# Patient Record
Sex: FEMALE | Race: WHITE | NOT HISPANIC OR LATINO | Employment: OTHER | URBAN - METROPOLITAN AREA
[De-identification: names, ages, dates, MRNs, and addresses within clinical notes are randomized per-mention and may not be internally consistent; named-entity substitution may affect disease eponyms.]

---

## 2017-01-16 ENCOUNTER — ALLSCRIPTS OFFICE VISIT (OUTPATIENT)
Dept: OTHER | Facility: OTHER | Age: 62
End: 2017-01-16

## 2017-01-16 DIAGNOSIS — R10.2 PELVIC AND PERINEAL PAIN: ICD-10-CM

## 2017-01-17 ENCOUNTER — HOSPITAL ENCOUNTER (OUTPATIENT)
Dept: RADIOLOGY | Age: 62
Discharge: HOME | End: 2017-01-17
Payer: COMMERCIAL

## 2017-01-17 DIAGNOSIS — R10.2 PELVIC AND PERINEAL PAIN: ICD-10-CM

## 2017-01-17 PROCEDURE — 76830 TRANSVAGINAL US NON-OB: CPT

## 2017-01-17 PROCEDURE — 76856 US EXAM PELVIC COMPLETE: CPT

## 2017-01-18 ENCOUNTER — LAB CONVERSION - ENCOUNTER (OUTPATIENT)
Dept: OTHER | Facility: OTHER | Age: 62
End: 2017-01-18

## 2017-01-18 ENCOUNTER — GENERIC CONVERSION - ENCOUNTER (OUTPATIENT)
Dept: OTHER | Facility: OTHER | Age: 62
End: 2017-01-18

## 2017-01-18 LAB
BACTERIA UR QL AUTO: NORMAL
BILIRUB UR QL STRIP: NEGATIVE
CHARACTER, URINE (HISTORICAL): CLEAR
COLOR UR: YELLOW
CRYSTAL TYPE (HISTORICAL): NORMAL PERHPF
CRYSTALS URNS QL MICRO: NORMAL
CULTURE RESULT (HISTORICAL): NO GROWTH
FINE GRAN CASTS URNS QL MICRO: NORMAL PERLPF (ref 0–1)
GLUCOSE UR STRIP-MCNC: NEGATIVE MG/DL
HGB UR QL STRIP.AUTO: NEGATIVE
HYALINE CASTS #/AREA URNS LPF: NORMAL PERLPF (ref 0–4)
KETONES UR STRIP-MCNC: NEGATIVE MG/DL
LEUKOCYTE ESTERASE UR QL STRIP: NEGATIVE
NITRITE UR QL STRIP: NEGATIVE
NON-SQ EPI CELLS URNS QL MICRO: NORMAL
PH UR STRIP.AUTO: 6 [PH] (ref 5–8)
PROT UR-MCNC: NEGATIVE G/DL
RBC CASTS URNS QL MICRO: NORMAL PERLPF (ref 0–1)
SP GR UR STRIP.AUTO: 1.03 (ref 1–1.03)
URINE RBC (HISTORICAL): NORMAL PERHPF
UROBILINOGEN UR QL STRIP.AUTO: 0.2 MG/DL (ref 0.2–1)
WBC #/AREA URNS AUTO: NORMAL PERHPF (ref 0–4)

## 2017-01-19 ENCOUNTER — GENERIC CONVERSION - ENCOUNTER (OUTPATIENT)
Dept: OTHER | Facility: OTHER | Age: 62
End: 2017-01-19

## 2017-01-20 ENCOUNTER — ALLSCRIPTS OFFICE VISIT (OUTPATIENT)
Dept: OTHER | Facility: OTHER | Age: 62
End: 2017-01-20

## 2017-01-23 LAB
A. VAGINALIS BY PCR (HISTORICAL): NOT DETECTED
A.VAGINALIS LOG (CELL/ML) (HISTORICAL): <3.25
BVAB 2 (HISTORICAL): NOT DETECTED
C. ALBICANS, DNA OR PCR (HISTORICAL): NOT DETECTED
CANDIDA GENUS (HISTORICAL): NOT DETECTED
GARDNERELLA BY MOL. METHOD (HISTORICAL): <3.25
GARDNERELLA BY MOL. METHOD (HISTORICAL): NOT DETECTED
LACTOBACILLUS (SPECIES) (HISTORICAL): <3.25
LACTOBACILLUS (SPECIES) (HISTORICAL): NOT DETECTED
MEGASPHAERA TYPE 1 (HISTORICAL): NOT DETECTED
TRICHOMONAS (HISTORICAL): NOT DETECTED

## 2017-01-25 ENCOUNTER — LAB CONVERSION - ENCOUNTER (OUTPATIENT)
Dept: OTHER | Facility: OTHER | Age: 62
End: 2017-01-25

## 2017-01-25 LAB — ENDOMETRIAL BIOPSY (HISTORICAL): NORMAL

## 2017-01-27 ENCOUNTER — GENERIC CONVERSION - ENCOUNTER (OUTPATIENT)
Dept: OTHER | Facility: OTHER | Age: 62
End: 2017-01-27

## 2017-01-30 ENCOUNTER — GENERIC CONVERSION - ENCOUNTER (OUTPATIENT)
Dept: OTHER | Facility: OTHER | Age: 62
End: 2017-01-30

## 2017-02-10 ENCOUNTER — GENERIC CONVERSION - ENCOUNTER (OUTPATIENT)
Dept: OTHER | Facility: OTHER | Age: 62
End: 2017-02-10

## 2017-02-13 ENCOUNTER — APPOINTMENT (OUTPATIENT)
Dept: LAB | Facility: MEDICAL CENTER | Age: 62
End: 2017-02-13
Payer: COMMERCIAL

## 2017-02-13 ENCOUNTER — GENERIC CONVERSION - ENCOUNTER (OUTPATIENT)
Dept: OTHER | Facility: OTHER | Age: 62
End: 2017-02-13

## 2017-02-13 ENCOUNTER — TRANSCRIBE ORDERS (OUTPATIENT)
Dept: ADMINISTRATIVE | Facility: HOSPITAL | Age: 62
End: 2017-02-13

## 2017-02-13 DIAGNOSIS — E78.5 HYPERLIPIDEMIA, UNSPECIFIED HYPERLIPIDEMIA TYPE: Primary | ICD-10-CM

## 2017-02-13 LAB
ALBUMIN SERPL BCP-MCNC: 3.7 G/DL (ref 3.5–5)
ALP SERPL-CCNC: 118 U/L (ref 46–116)
ALT SERPL W P-5'-P-CCNC: 50 U/L (ref 12–78)
ANION GAP SERPL CALCULATED.3IONS-SCNC: 4 MMOL/L (ref 4–13)
AST SERPL W P-5'-P-CCNC: 27 U/L (ref 5–45)
BILIRUB SERPL-MCNC: 0.45 MG/DL (ref 0.2–1)
BUN SERPL-MCNC: 18 MG/DL (ref 5–25)
CALCIUM SERPL-MCNC: 8.9 MG/DL (ref 8.3–10.1)
CHLORIDE SERPL-SCNC: 106 MMOL/L (ref 100–108)
CHOLEST SERPL-MCNC: 240 MG/DL (ref 50–200)
CO2 SERPL-SCNC: 27 MMOL/L (ref 21–32)
CREAT SERPL-MCNC: 0.78 MG/DL (ref 0.6–1.3)
GFR SERPL CREATININE-BSD FRML MDRD: >60 ML/MIN/1.73SQ M
GLUCOSE SERPL-MCNC: 79 MG/DL (ref 65–140)
HDLC SERPL-MCNC: 83 MG/DL (ref 40–60)
LDLC SERPL CALC-MCNC: 137 MG/DL (ref 0–100)
LDLC SERPL DIRECT ASSAY-MCNC: 145 MG/DL (ref 0–100)
POTASSIUM SERPL-SCNC: 4.4 MMOL/L (ref 3.5–5.3)
PROT SERPL-MCNC: 7 G/DL (ref 6.4–8.2)
SODIUM SERPL-SCNC: 137 MMOL/L (ref 136–145)
TRIGL SERPL-MCNC: 99 MG/DL

## 2017-02-13 PROCEDURE — 80053 COMPREHEN METABOLIC PANEL: CPT | Performed by: FAMILY MEDICINE

## 2017-02-13 PROCEDURE — 80061 LIPID PANEL: CPT | Performed by: FAMILY MEDICINE

## 2017-02-13 PROCEDURE — 36415 COLL VENOUS BLD VENIPUNCTURE: CPT | Performed by: FAMILY MEDICINE

## 2017-02-13 PROCEDURE — 83721 ASSAY OF BLOOD LIPOPROTEIN: CPT | Performed by: FAMILY MEDICINE

## 2017-02-16 ENCOUNTER — HOSPITAL ENCOUNTER (OUTPATIENT)
Dept: RADIOLOGY | Facility: HOSPITAL | Age: 62
Discharge: HOME | End: 2017-02-16
Payer: COMMERCIAL

## 2017-02-16 ENCOUNTER — TRANSCRIBE ORDERS (OUTPATIENT)
Dept: ADMINISTRATIVE | Facility: HOSPITAL | Age: 62
End: 2017-02-16

## 2017-02-16 ENCOUNTER — ALLSCRIPTS OFFICE VISIT (OUTPATIENT)
Dept: OTHER | Facility: OTHER | Age: 62
End: 2017-02-16

## 2017-02-16 DIAGNOSIS — M25.562 LEFT KNEE PAIN, UNSPECIFIED CHRONICITY: ICD-10-CM

## 2017-02-16 DIAGNOSIS — M25.562 LEFT KNEE PAIN, UNSPECIFIED CHRONICITY: Primary | ICD-10-CM

## 2017-02-16 PROCEDURE — 73562 X-RAY EXAM OF KNEE 3: CPT

## 2017-02-28 ENCOUNTER — TRANSCRIBE ORDERS (OUTPATIENT)
Dept: ADMINISTRATIVE | Facility: HOSPITAL | Age: 62
End: 2017-02-28

## 2017-02-28 ENCOUNTER — ALLSCRIPTS OFFICE VISIT (OUTPATIENT)
Dept: OTHER | Facility: OTHER | Age: 62
End: 2017-02-28

## 2017-02-28 DIAGNOSIS — M25.562 PAIN IN LEFT KNEE: ICD-10-CM

## 2017-02-28 DIAGNOSIS — R93.89 ABNORMAL FINDINGS ON DIAGNOSTIC IMAGING OF OTHER SPECIFIED BODY STRUCTURES: ICD-10-CM

## 2017-02-28 DIAGNOSIS — M25.562 ACUTE PAIN OF LEFT KNEE: Primary | ICD-10-CM

## 2017-02-28 DIAGNOSIS — M76.892 OTHER SPECIFIED ENTHESOPATHIES OF LEFT LOWER LIMB, EXCLUDING FOOT: ICD-10-CM

## 2017-03-07 ENCOUNTER — HOSPITAL ENCOUNTER (OUTPATIENT)
Dept: RADIOLOGY | Facility: HOSPITAL | Age: 62
Discharge: HOME | End: 2017-03-07
Payer: COMMERCIAL

## 2017-03-07 DIAGNOSIS — M25.562 PAIN IN LEFT KNEE: ICD-10-CM

## 2017-03-07 PROCEDURE — 73721 MRI JNT OF LWR EXTRE W/O DYE: CPT

## 2017-03-15 ENCOUNTER — ALLSCRIPTS OFFICE VISIT (OUTPATIENT)
Dept: OTHER | Facility: OTHER | Age: 62
End: 2017-03-15

## 2017-03-27 ENCOUNTER — ALLSCRIPTS OFFICE VISIT (OUTPATIENT)
Dept: OTHER | Facility: OTHER | Age: 62
End: 2017-03-27

## 2017-03-27 PROCEDURE — 88305 TISSUE EXAM BY PATHOLOGIST: CPT | Performed by: OBSTETRICS & GYNECOLOGY

## 2017-03-28 ENCOUNTER — LAB REQUISITION (OUTPATIENT)
Dept: LAB | Facility: HOSPITAL | Age: 62
End: 2017-03-28
Payer: COMMERCIAL

## 2017-03-28 DIAGNOSIS — R93.89 ABNORMAL FINDINGS ON DIAGNOSTIC IMAGING OF OTHER SPECIFIED BODY STRUCTURES: ICD-10-CM

## 2017-04-04 ENCOUNTER — GENERIC CONVERSION - ENCOUNTER (OUTPATIENT)
Dept: OTHER | Facility: OTHER | Age: 62
End: 2017-04-04

## 2017-04-10 ENCOUNTER — GENERIC CONVERSION - ENCOUNTER (OUTPATIENT)
Dept: OTHER | Facility: OTHER | Age: 62
End: 2017-04-10

## 2017-05-23 ENCOUNTER — TRANSCRIBE ORDERS (OUTPATIENT)
Dept: ADMINISTRATIVE | Facility: HOSPITAL | Age: 62
End: 2017-05-23

## 2017-05-23 DIAGNOSIS — M54.5 LOW BACK PAIN, UNSPECIFIED BACK PAIN LATERALITY, UNSPECIFIED CHRONICITY, WITH SCIATICA PRESENCE UNSPECIFIED: Primary | ICD-10-CM

## 2017-05-28 ENCOUNTER — HOSPITAL ENCOUNTER (OUTPATIENT)
Dept: RADIOLOGY | Facility: HOSPITAL | Age: 62
Discharge: HOME | End: 2017-05-28
Payer: COMMERCIAL

## 2017-05-28 DIAGNOSIS — M54.5 LOW BACK PAIN, UNSPECIFIED BACK PAIN LATERALITY, UNSPECIFIED CHRONICITY, WITH SCIATICA PRESENCE UNSPECIFIED: ICD-10-CM

## 2017-05-28 PROCEDURE — 72148 MRI LUMBAR SPINE W/O DYE: CPT

## 2017-06-14 ENCOUNTER — APPOINTMENT (OUTPATIENT)
Dept: PHYSICAL THERAPY | Facility: MEDICAL CENTER | Age: 62
End: 2017-06-14
Payer: COMMERCIAL

## 2017-06-14 PROCEDURE — 97162 PT EVAL MOD COMPLEX 30 MIN: CPT

## 2017-06-19 ENCOUNTER — APPOINTMENT (OUTPATIENT)
Dept: PHYSICAL THERAPY | Facility: MEDICAL CENTER | Age: 62
End: 2017-06-19
Payer: COMMERCIAL

## 2017-06-19 ENCOUNTER — GENERIC CONVERSION - ENCOUNTER (OUTPATIENT)
Dept: OTHER | Facility: OTHER | Age: 62
End: 2017-06-19

## 2017-06-19 PROCEDURE — 97010 HOT OR COLD PACKS THERAPY: CPT

## 2017-06-19 PROCEDURE — 97110 THERAPEUTIC EXERCISES: CPT

## 2017-06-19 PROCEDURE — 97140 MANUAL THERAPY 1/> REGIONS: CPT

## 2017-06-21 ENCOUNTER — APPOINTMENT (OUTPATIENT)
Dept: PHYSICAL THERAPY | Facility: MEDICAL CENTER | Age: 62
End: 2017-06-21
Payer: COMMERCIAL

## 2017-06-21 PROCEDURE — 97140 MANUAL THERAPY 1/> REGIONS: CPT

## 2017-06-21 PROCEDURE — 97110 THERAPEUTIC EXERCISES: CPT

## 2017-06-26 ENCOUNTER — APPOINTMENT (OUTPATIENT)
Dept: PHYSICAL THERAPY | Facility: MEDICAL CENTER | Age: 62
End: 2017-06-26
Payer: COMMERCIAL

## 2017-06-26 PROCEDURE — 97110 THERAPEUTIC EXERCISES: CPT

## 2017-06-26 PROCEDURE — 97140 MANUAL THERAPY 1/> REGIONS: CPT

## 2017-06-26 PROCEDURE — 97010 HOT OR COLD PACKS THERAPY: CPT

## 2017-06-28 ENCOUNTER — APPOINTMENT (OUTPATIENT)
Dept: PHYSICAL THERAPY | Facility: MEDICAL CENTER | Age: 62
End: 2017-06-28
Payer: COMMERCIAL

## 2017-06-28 PROCEDURE — 97140 MANUAL THERAPY 1/> REGIONS: CPT

## 2017-06-28 PROCEDURE — 97010 HOT OR COLD PACKS THERAPY: CPT

## 2017-06-28 PROCEDURE — 97110 THERAPEUTIC EXERCISES: CPT

## 2017-07-03 ENCOUNTER — APPOINTMENT (OUTPATIENT)
Dept: PHYSICAL THERAPY | Facility: MEDICAL CENTER | Age: 62
End: 2017-07-03
Payer: COMMERCIAL

## 2017-07-06 ENCOUNTER — APPOINTMENT (OUTPATIENT)
Dept: PHYSICAL THERAPY | Facility: MEDICAL CENTER | Age: 62
End: 2017-07-06
Payer: COMMERCIAL

## 2017-07-06 PROCEDURE — 97110 THERAPEUTIC EXERCISES: CPT

## 2017-07-06 PROCEDURE — 97140 MANUAL THERAPY 1/> REGIONS: CPT

## 2017-07-10 ENCOUNTER — APPOINTMENT (OUTPATIENT)
Dept: PHYSICAL THERAPY | Facility: MEDICAL CENTER | Age: 62
End: 2017-07-10
Payer: COMMERCIAL

## 2017-07-10 PROCEDURE — 97110 THERAPEUTIC EXERCISES: CPT

## 2017-07-10 PROCEDURE — 97140 MANUAL THERAPY 1/> REGIONS: CPT

## 2017-07-12 ENCOUNTER — APPOINTMENT (OUTPATIENT)
Dept: PHYSICAL THERAPY | Facility: MEDICAL CENTER | Age: 62
End: 2017-07-12
Payer: COMMERCIAL

## 2017-07-12 PROCEDURE — 97140 MANUAL THERAPY 1/> REGIONS: CPT

## 2017-07-12 PROCEDURE — 97110 THERAPEUTIC EXERCISES: CPT

## 2017-07-14 ENCOUNTER — GENERIC CONVERSION - ENCOUNTER (OUTPATIENT)
Dept: OTHER | Facility: OTHER | Age: 62
End: 2017-07-14

## 2017-07-17 ENCOUNTER — APPOINTMENT (OUTPATIENT)
Dept: PHYSICAL THERAPY | Facility: MEDICAL CENTER | Age: 62
End: 2017-07-17
Payer: COMMERCIAL

## 2017-07-19 ENCOUNTER — APPOINTMENT (OUTPATIENT)
Dept: PHYSICAL THERAPY | Facility: MEDICAL CENTER | Age: 62
End: 2017-07-19
Payer: COMMERCIAL

## 2017-07-31 ENCOUNTER — ALLSCRIPTS OFFICE VISIT (OUTPATIENT)
Dept: OTHER | Facility: OTHER | Age: 62
End: 2017-07-31

## 2017-07-31 ENCOUNTER — APPOINTMENT (OUTPATIENT)
Dept: LAB | Facility: CLINIC | Age: 62
End: 2017-07-31
Payer: COMMERCIAL

## 2017-07-31 ENCOUNTER — TRANSCRIBE ORDERS (OUTPATIENT)
Dept: LAB | Facility: CLINIC | Age: 62
End: 2017-07-31

## 2017-07-31 DIAGNOSIS — M51.16 INTERVERTEBRAL DISC DISORDER WITH RADICULOPATHY OF LUMBAR REGION: ICD-10-CM

## 2017-07-31 DIAGNOSIS — Z01.818 ENCOUNTER FOR OTHER PREPROCEDURAL EXAMINATION: ICD-10-CM

## 2017-07-31 DIAGNOSIS — D49.2 NEOPLASM OF UNSPECIFIED BEHAVIOR OF BONE, SOFT TISSUE, AND SKIN: ICD-10-CM

## 2017-07-31 LAB
BUN SERPL-MCNC: 19 MG/DL (ref 5–25)
CREAT SERPL-MCNC: 0.73 MG/DL (ref 0.6–1.3)
GFR SERPL CREATININE-BSD FRML MDRD: 90 ML/MIN/1.73SQ M

## 2017-07-31 PROCEDURE — 84520 ASSAY OF UREA NITROGEN: CPT

## 2017-07-31 PROCEDURE — 36415 COLL VENOUS BLD VENIPUNCTURE: CPT

## 2017-07-31 PROCEDURE — 82565 ASSAY OF CREATININE: CPT

## 2017-08-09 ENCOUNTER — ALLSCRIPTS OFFICE VISIT (OUTPATIENT)
Dept: OTHER | Facility: OTHER | Age: 62
End: 2017-08-09

## 2017-08-09 ENCOUNTER — TRANSCRIBE ORDERS (OUTPATIENT)
Dept: ADMINISTRATIVE | Facility: HOSPITAL | Age: 62
End: 2017-08-09

## 2017-08-09 DIAGNOSIS — M51.16 NEURITIS OR RADICULITIS DUE TO RUPTURE OF LUMBAR INTERVERTEBRAL DISC: Primary | ICD-10-CM

## 2017-08-11 ENCOUNTER — GENERIC CONVERSION - ENCOUNTER (OUTPATIENT)
Dept: OTHER | Facility: OTHER | Age: 62
End: 2017-08-11

## 2017-08-11 ENCOUNTER — ALLSCRIPTS OFFICE VISIT (OUTPATIENT)
Dept: OTHER | Facility: OTHER | Age: 62
End: 2017-08-11

## 2017-08-25 ENCOUNTER — HOSPITAL ENCOUNTER (OUTPATIENT)
Dept: RADIOLOGY | Facility: HOSPITAL | Age: 62
Discharge: HOME | End: 2017-08-25
Attending: ORTHOPAEDIC SURGERY
Payer: COMMERCIAL

## 2017-08-25 DIAGNOSIS — M51.16 INTERVERTEBRAL DISC DISORDER WITH RADICULOPATHY OF LUMBAR REGION: ICD-10-CM

## 2017-08-25 DIAGNOSIS — D49.2 NEOPLASM OF UNSPECIFIED BEHAVIOR OF BONE, SOFT TISSUE, AND SKIN: ICD-10-CM

## 2017-08-25 PROCEDURE — 72158 MRI LUMBAR SPINE W/O & W/DYE: CPT

## 2017-08-25 PROCEDURE — A9585 GADOBUTROL INJECTION: HCPCS | Performed by: ORTHOPAEDIC SURGERY

## 2017-08-25 RX ADMIN — GADOBUTROL 8 ML: 604.72 INJECTION INTRAVENOUS at 16:30

## 2017-09-18 ENCOUNTER — ALLSCRIPTS OFFICE VISIT (OUTPATIENT)
Dept: OTHER | Facility: OTHER | Age: 62
End: 2017-09-18

## 2017-11-18 ENCOUNTER — APPOINTMENT (OUTPATIENT)
Dept: URGENT CARE | Facility: MEDICAL CENTER | Age: 62
End: 2017-11-18
Payer: COMMERCIAL

## 2017-11-18 PROCEDURE — 99203 OFFICE O/P NEW LOW 30 MIN: CPT

## 2017-12-10 ENCOUNTER — OFFICE VISIT (OUTPATIENT)
Dept: URGENT CARE | Facility: MEDICAL CENTER | Age: 62
End: 2017-12-10
Payer: COMMERCIAL

## 2017-12-10 PROCEDURE — 99213 OFFICE O/P EST LOW 20 MIN: CPT

## 2017-12-12 NOTE — PROGRESS NOTES
Assessment    1  Chronic sinusitis (473 9) (J32 9)    Plan  Chronic sinusitis    · Clindamycin HCl - 300 MG Oral Capsule; Take 1 capsule twice daily    Discussion/Summary  Discussion Summary:   1  Take Clindamycin 300mg  1 tablet twice daily x 7 daysPush fluidsUse Flonase 2 sprays each nostril dailyAcidophilus daily while on Clindamycin5  Recommend follow-up with ENT  Medication Side Effects Reviewed: Possible side effects of new medications were reviewed with the patient/guardian today  Understands and agrees with treatment plan: The treatment plan was reviewed with the patient/guardian  The patient/guardian understands and agrees with the treatment plan      Chief Complaint    1  Headache  Chief Complaint Free Text Note Form: Pt presents with recurrent sinusitis      History of Present Illness  HPI: Patient is a 78-year-old female presents with a 6 week history of recurring sinus pressure, headache and postnasal drip  She states she was treated with Bactrim 2 weeks prior which improved her symptoms somewhat but they returned over the past 7 days  Patient denies new fever but does complain of increasing headaches and sinus pressure  She is scheduled to see ear nose and throat on 12/29/2017  Hospital Based Practices Required Assessment:  Pain Assessment  the patient states they have pain  The pain is located in the sinus  (on a scale of 0 to 10, the patient rates the pain at 6 )  Abuse And Domestic Violence Screen   Yes, the patient is safe at home  -- The patient states no one is hurting them  Depression And Suicide Screen  No, the patient has not had thoughts of hurting themself  No, the patient has not felt depressed in the past 7 days  Prefered Language is  english  Primary Language is  english  Readiness To Learn: Receptive  Barriers To Learning: none    Preferred Learning: verbal      Review of Systems  Focused-Female:  Constitutional: No fever, no chills, feels well, no tiredness, no recent weight gain or loss  ENT: no sore throat-- and-- no nasal discharge  Respiratory: cough-- and-- PND  Active Problems  1  Abdominal pain, LLQ (left lower quadrant) (789 04) (R10 32)   2  Backache (724 5) (M54 9)   3  Complex endometrial hyperplasia without atypia (621 32) (N85 01)   4  History of sinusitis (V12 69) (Z87 09)   5  Intervertebral disc disorder with radiculopathy of lumbar region (724 4) (M51 16)   6  Left knee pain (719 46) (M25 562)   7  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)   8  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)   9  Pelvic pain in female (625 9) (R10 2)   10  Pes anserinus tendinitis of left lower extremity (726 61) (M76 892)   11  Postmenopausal bleeding (627 1) (N95 0)   12  Pre-op testing (V72 84) (Z01 818)   13  Symptoms involving urinary system (788 99) (R39 9)   14  Thickened endometrium (793 5) (R93 8)   15  Urinary frequency (788 41) (R35 0)   16  Urinary hesitancy (788 64) (R39 11)   17  Vaginal discharge (623 5) (N89 8)    Past Medical History  1  History of Cervical polyp (622 7) (N84 1)   2  History of candidal vulvovaginitis (V13 29) (Z86 19)   3  History of herpes simplex infection (V12 09) (Z86 19)   4  History of Mild cervical dysplasia (622 11) (N87 0)   5  Normal delivery (650) (O80,Z37 9)   6  History of Right rotator cuff tear (840 4) (M75 101)   7  History of Supraventricular tachycardia (427 89) (I47 1)   8  History of Urinary Tract Infection (V13 02)  Active Problems And Past Medical History Reviewed: The active problems and past medical history were reviewed and updated today  Family History  Mother    1  Family history of Anemia (V18 2)   2  Family history of Hypertension (V17 49)  Father    3  Family history of Hypertension (V17 49)   4  Family history of Prostate cancer (80) (C61)  Sister    5  Family history of Anemia (V18 2)   6  Family history of Thyroid Cancer  Maternal Grandmother    7   Family history of Coronary Arteriosclerosis (V17 49)  Maternal Grandfather    8  Family history of Lung Cancer (V16 1)  Family History Reviewed: The family history was reviewed and updated today  Social History   · Always uses seat belt   · Being A Social Drinker   · Caffeine use (V49 89) (F15 90)   · Daily Coffee Consumption (2  Cups/Day)   · Denied: History of Drug use   · Exercising Erratically   · Feels safe at home   · Former smoker (Q90 09) (P99 073)   · Marital History - Currently   Social History Reviewed: The social history was reviewed and updated today  Surgical History    1  History of Appendectomy   2  History of Biopsy Endometrial, Without Cervical Dilation   3  History of Cervical Loop Electrosurgical Excision (LEEP)   4  History of  Section   5  History of Complete Colonoscopy   6  History of Diagnostic Cystoscopy   7  History of Dilation And Curettage   8  History of Hysteroscopy   9  History of Oophorectomy   10  History of Ovarian Cystectomy   11  History of Rotator Cuff Repair   12  History of Salpingo-oophorectomy Right Side   13  History of Tonsillectomy  Surgical History Reviewed: The surgical history was reviewed and updated today  Current Meds   1  Acidophilus CAPS; Therapy: (Recorded:2013) to Recorded   2  B Complex Oral Capsule Recorded   3  Cranberry CAPS Recorded   4  DiphenhydrAMINE HCl - 25 MG Oral Capsule; Therapy: (Recorded:2013) to Recorded   5  MedroxyPROGESTERone Acetate 10 MG Oral Tablet; take 1 tablet by mouth every day; Therapy: 06BLS7916 to ([id number])  Requested for: 2017; Last Rx:2017 Ordered   6  Mucinex 600 MG Oral Tablet Extended Release 12 Hour Recorded   7  Naproxen Sodium 550 MG Oral Tablet; Therapy: (Recorded:03Qac1175) to Recorded   8  Niacin  MG Oral Capsule Extended Release; Therapy: (Recorded:2013) to Recorded   9  Progesterone Micronized 200 MG Oral Capsule; TAKE ONE CAPSULE BY MOUTH EVERY DAY;  Therapy: 65Ztg3534 to (RLLRJIEE:66YPA5532)  Requested for: 24HOE0015; Last Rx:01Nov2017 Ordered   10  Turmeric TABS Recorded   11  Vitamin C 500 MG Oral Tablet Recorded   12  Vitamin D3 1000 UNIT Oral Tablet Recorded   13  Vitamin E Complete Oral Capsule Recorded  Medication List Reviewed: The medication list was reviewed and updated today  Allergies    1  Penicillins    Vitals  Signs   Recorded: 40XZH8455 10:59AM   Temperature: 98 8 F  Heart Rate: 88  Respiration: 18  Systolic: 076  Diastolic: 94  O2 Saturation: 98    Physical Exam   Constitutional  General appearance: No acute distress, well appearing and well nourished  Ears, Nose, Mouth, and Throat  External inspection of ears and nose: Normal    Otoscopic examination: Tympanic membranes translucent with normal light reflex  Canals patent without erythema  Nasal mucosa, septum, and turbinates: Abnormal  -- nasal turbinates boggy and swollen no visible discharge  + maxillary tenderness  Oropharynx: Normal with no erythema, edema, exudate or lesions  Pulmonary  Respiratory effort: No increased work of breathing or signs of respiratory distress  Auscultation of lungs: Clear to auscultation  Cardiovascular  Auscultation of heart: Normal rate and rhythm, normal S1 and S2, without murmurs         Signatures   Electronically signed by : Renée Dowling, [facility]; Dec 10 2017 11:37AM EST                       (Author)    Electronically signed by : PATRIA Posadas Sa ; Dec 11 2017  2:45PM EST                       (Co-author)

## 2018-01-09 NOTE — MISCELLANEOUS
Message   Recorded as Task   Date: 02/10/2017 03:24 PM, Created By: Adithya Amor   Task Name: Medical Complaint Callback   Assigned To: Clementina Read   Regarding Patient: Kimo Dickson, Status: In Progress   Comment:    Mer Amador - 10 Feb 2017 3:24 PM     TASK CREATED  Caller: Self; [phone] (Home)  pt had eb on 1/20 pt states she was told if pt started to spot please call office pt started to spot on Wednesday please advise pt @ [phone]   [facility] - 10 Feb 2017 3:33 PM     TASK IN PROGRESS   Aleyda Uribe - 10 Feb 2017 3:38 PM     TASK EDITED  pt states she has been experiencing pink disch for the past 3 days and was to inform Bri Velazquez - 10 Feb 2017 3:38 PM     TASK REASSIGNED: Previously Assigned To Denna Holter - 13 Feb 2017 2:22 PM     TASK REPLIED TO: Previously Assigned To [address] [place] [address] - please advise that she schedules an appt with one of the docs at her earliest convenience to discuss possible need for D&C/hysteroscopy  Given that she had a normal recent EMB I am reassured, however her endometrial lining measured 9mm on US and now has spotting, so further work up should be considered  If she sees a doc that could be a preop discussion if indicated, so it would be more efficient and save her a trip/copay  Thanks   Dru Linton - 13 Feb 2017 3:08 PM     TASK EDITED  [facility]   Dru Linton - 13 Feb 2017 3:09 PM     TASK EDITED  [facility] - 13 Feb 2017 3:31 PM     TASK EDITED  Pt given apt with VG        Active Problems    1  Abdominal pain, LLQ (left lower quadrant) (789 04) (R10 32)   2  Backache (724 5) (M54 9)   3  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)   4  Pelvic pain in female (625 9) (R10 2)   5  Right rotator cuff tear (840 4) (M75 101)   6  Symptoms involving urinary system (788 99) (R39 9)   7  Thickened endometrium (793 5) (R93 8)   8  Urinary frequency (788 41) (R35 0)   9   Urinary hesitancy (788 64) (R39 11)   10  Vaginal discharge (623 5) (N89 8)    Current Meds   1  Acidophilus CAPS; Therapy: (Recorded:31Oct2013) to Recorded   2  DiphenhydrAMINE HCl - 25 MG Oral Capsule; Therapy: (Recorded:31Oct2013) to Recorded   3  Niacin  MG Oral Capsule Extended Release; Therapy: (Recorded:31Oct2013) to Recorded    Allergies    1  Penicillins    Signatures   Electronically signed by :  Betsy Gipson, ; Feb 13 2017  3:31PM EST                       (Author)

## 2018-01-10 NOTE — MISCELLANEOUS
Message   Recorded as Task   Date: 04/10/2017 09:15 AM, Created By: Paul Weaver   Task Name: Follow Up   Assigned To: Lou Ndiaye   Regarding Patient: Shara Mckeon, Status: Active   CommentAmbreen Elizondo - 10 Apr 2017 9:15 AM     TASK CREATED  Caller: Self; [phone] (Home); [phone] (Work)  [phone] pt wants a natural progesterone not the one dr jewell ordered for her please advise   Nick Munoz - 10 Apr 2017 9:30 AM     TASK REASSIGNED: Previously Assigned To [facility],Team  can you help me out with this?  she would like a natural progesterone, "the one closet to what her body makes"   sp spoke with patient, explained to her that synthetic progestagens are not bad for her health  OK to change to micronized progesterone   will Rx Prometrium 200 mg daily po      Signatures   Electronically signed by : NONA Hernández ; Apr 10 2017 11:16AM EST                       (Author)

## 2018-01-10 NOTE — MISCELLANEOUS
Message   Recorded as Task   Date: 02/10/2017 03:24 PM, Created By: Kendy Martino   Task Name: Medical Complaint Callback   Assigned To: Cesar Marin   Regarding Patient: Ron Rivera, Status: In Progress   Comment:    Mer Amador - 10 Feb 2017 3:24 PM     TASK CREATED  Caller: Self; [phone] (Home)  pt had eb on 1/20 pt states she was told if pt started to spot please call office pt started to spot on Wednesday please advise pt @ [phone]   Thu Thompson - 10 Feb 2017 3:33 PM     TASK IN PROGRESS   Jojo,Aleyda - 10 Feb 2017 3:38 PM     TASK EDITED  pt states she has been experiencing pink disch for the past 3 days and was to inform elif        Active Problems    1  Abdominal pain, LLQ (left lower quadrant) (789 04) (R10 32)   2  Backache (724 5) (M54 9)   3  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)   4  Pelvic pain in female (625 9) (R10 2)   5  Right rotator cuff tear (840 4) (M75 101)   6  Symptoms involving urinary system (788 99) (R39 9)   7  Thickened endometrium (793 5) (R93 8)   8  Urinary frequency (788 41) (R35 0)   9  Urinary hesitancy (788 64) (R39 11)   10  Vaginal discharge (623 5) (N89 8)    Current Meds   1  Acidophilus CAPS; Therapy: (Recorded:31Oct2013) to Recorded   2  DiphenhydrAMINE HCl - 25 MG Oral Capsule; Therapy: (Recorded:31Oct2013) to Recorded   3  Niacin  MG Oral Capsule Extended Release; Therapy: (Recorded:31Oct2013) to Recorded    Allergies    1   Penicillins    Signatures   Electronically signed by : Ana Albert, ; Feb 10 2017  3:38PM EST                       (Author)

## 2018-01-11 NOTE — RESULT NOTES
Message   Recorded as Task   Date: 08/11/2017 10:41 AM, Created By: Neal Winkler   Task Name: Care Coordination   Assigned To: Kathleen Jang   Regarding Patient: Karla Maldonado, Status: Active   Comment:    Kathleen Jang - 11 Aug 2017 10:41 AM     TASK CREATED  Call rec'd from patient  She had been seen by Dr Deysi Walters on 7/31 for back and leg pain  In summary, he also discussed her left knee pain; samples and Rx for Pennsaid provided which she states has been providing some relief  He noted that "we can give left knee intra-articular steroid injection to help reduce inflammation in the joint"  Patient inquired about getting this knee injection, possibly prior to leaving for her vacation on 8/15  No open procedure appts available; we did schedule for 8/31 but I did state that I would inquire with Dr Deysi Walters about possible injection today, pending insurance authorization  Juan Manuel Escoto inquired with Dr Deysi Walters while I contacted [facility]  Spoke with Mabel Samuel ; no Tico Xavier is needed for CPT 05317, 57344 (knee inj, fluoro); ref# [id number]  Per [facility], Dr Deysi Walters agreeable to double-booking patient today for injection (no fluoro/USGI) only @6044  I called patient and she will arrive to [place] office @1062; is aware that this will be billed as OV and co-pay expected  She stated understanding  Very appreciative       (FYI: MRI Lspine with contrast is scheduled for 8/25)   Phyllis Azar - 11 Aug 2017 12:28 PM     TASK REPLIED TO: Previously Assigned To Phyllis Azar md aware

## 2018-01-12 VITALS
DIASTOLIC BLOOD PRESSURE: 80 MMHG | HEIGHT: 64 IN | HEART RATE: 85 BPM | WEIGHT: 200 LBS | SYSTOLIC BLOOD PRESSURE: 145 MMHG | BODY MASS INDEX: 34.15 KG/M2

## 2018-01-12 VITALS
HEART RATE: 88 BPM | HEIGHT: 64 IN | SYSTOLIC BLOOD PRESSURE: 126 MMHG | OXYGEN SATURATION: 98 % | WEIGHT: 196 LBS | DIASTOLIC BLOOD PRESSURE: 76 MMHG | RESPIRATION RATE: 16 BRPM | TEMPERATURE: 98.5 F | BODY MASS INDEX: 33.46 KG/M2

## 2018-01-12 NOTE — MISCELLANEOUS
Message   Recorded as Task   Date: 01/27/2017 02:29 PM, Created By: Charlotte Orta   Task Name: Result Follow Up   Assigned To: [facility],Team   Regarding Patient: Reynaldo Reyes, Status: In Progress   Aileen Winston - 27 Jan 2017 2:29 PM     TASK CREATED  Neg EMB   Please notify patient and advise to monitor closely for  bleeding   Thanks   Zaire Isaac - 27 Jan 2017 4:31 PM     TASK REASSIGNED: Previously Assigned To Levon Carranza - 30 Jan 2017 8:43 AM     TASK IN PROGRESS   Cherylene Hakim - 30 Jan 2017 8:45 AM     TASK EDITED  lmtcb   Cherylene Hakim - 30 Jan 2017 12:12 PM     TASK EDITED  Pt will call if any  bleeding        Active Problems    1  Abdominal pain, LLQ (left lower quadrant) (789 04) (R10 32)   2  Backache (724 5) (M54 9)   3  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)   4  Pelvic pain in female (625 9) (R10 2)   5  Right rotator cuff tear (840 4) (M75 101)   6  Symptoms involving urinary system (788 99) (R39 9)   7  Thickened endometrium (793 5) (R93 8)   8  Urinary frequency (788 41) (R35 0)   9  Urinary hesitancy (788 64) (R39 11)   10  Vaginal discharge (623 5) (N89 8)    Current Meds   1  Acidophilus CAPS; Therapy: (Recorded:31Oct2013) to Recorded   2  DiphenhydrAMINE HCl - 25 MG Oral Capsule; Therapy: (Recorded:31Oct2013) to Recorded   3  Niacin  MG Oral Capsule Extended Release; Therapy: (Recorded:31Oct2013) to Recorded    Allergies    1  Penicillins    Signatures   Electronically signed by :  Kevin Gipson, ; Jan 30 2017 12:13PM EST                       (Author)

## 2018-01-13 VITALS
BODY MASS INDEX: 33.8 KG/M2 | HEIGHT: 64 IN | SYSTOLIC BLOOD PRESSURE: 127 MMHG | HEART RATE: 93 BPM | WEIGHT: 198 LBS | DIASTOLIC BLOOD PRESSURE: 80 MMHG

## 2018-01-13 VITALS
BODY MASS INDEX: 33.29 KG/M2 | WEIGHT: 195 LBS | HEIGHT: 64 IN | SYSTOLIC BLOOD PRESSURE: 146 MMHG | DIASTOLIC BLOOD PRESSURE: 90 MMHG

## 2018-01-13 VITALS
SYSTOLIC BLOOD PRESSURE: 146 MMHG | BODY MASS INDEX: 33.8 KG/M2 | HEART RATE: 80 BPM | DIASTOLIC BLOOD PRESSURE: 78 MMHG | WEIGHT: 198 LBS | RESPIRATION RATE: 16 BRPM | HEIGHT: 64 IN

## 2018-01-13 NOTE — MISCELLANEOUS
Message   Recorded as Task   Date: 01/27/2017 03:07 PM, Created By: Sowmya Atkins   Task Name: Call Back   Assigned To: [facility],Team   Regarding Patient: Pratik Mi, Status: In Progress   Comment:    Kathleen Shaffer - 27 Jan 2017 3:07 PM     TASK CREATED  PT CALLED FOR EMB RESULTS IN WHICH I TOLD HER WERE NEGATIVE  THIS WAS DONE 2ND TO A THICKENED ENDOMETRIAL STRIPE OF 9  PT WANTS TO KNOW WHAT IS NEXT? WILL SHE NEED F/U ULTRASOUNDS EVERY SEVERAL MONTHS TO EVALUATE? 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)   4  Pelvic pain in female (625 9) (R10 2)   5  Right rotator cuff tear (840 4) (M75 101)   6  Symptoms involving urinary system (788 99) (R39 9)   7  Thickened endometrium (793 5) (R93 8)   8  Urinary frequency (788 41) (R35 0)   9  Urinary hesitancy (788 64) (R39 11)   10  Vaginal discharge (623 5) (N89 8)    Current Meds   1  Acidophilus CAPS; Therapy: (Recorded:31Oct2013) to Recorded   2  DiphenhydrAMINE HCl - 25 MG Oral Capsule; Therapy: (Recorded:31Oct2013) to Recorded   3  Niacin  MG Oral Capsule Extended Release; Therapy: (Recorded:31Oct2013) to Recorded    Allergies    1   Penicillins    Signatures   Electronically signed by : Marisel Castro, ; Jan 27 2017  3:57PM EST                       (Author)

## 2018-01-14 VITALS
DIASTOLIC BLOOD PRESSURE: 79 MMHG | BODY MASS INDEX: 34.15 KG/M2 | HEIGHT: 64 IN | HEART RATE: 76 BPM | WEIGHT: 200 LBS | SYSTOLIC BLOOD PRESSURE: 135 MMHG

## 2018-01-14 VITALS
WEIGHT: 194.38 LBS | DIASTOLIC BLOOD PRESSURE: 80 MMHG | HEART RATE: 98 BPM | BODY MASS INDEX: 33.19 KG/M2 | SYSTOLIC BLOOD PRESSURE: 154 MMHG | HEIGHT: 64 IN

## 2018-01-14 VITALS
BODY MASS INDEX: 34.15 KG/M2 | DIASTOLIC BLOOD PRESSURE: 82 MMHG | WEIGHT: 200 LBS | HEIGHT: 64 IN | SYSTOLIC BLOOD PRESSURE: 146 MMHG

## 2018-01-14 VITALS
HEIGHT: 64 IN | BODY MASS INDEX: 33.29 KG/M2 | DIASTOLIC BLOOD PRESSURE: 80 MMHG | SYSTOLIC BLOOD PRESSURE: 126 MMHG | WEIGHT: 195 LBS

## 2018-01-17 NOTE — MISCELLANEOUS
Message  spoke with patient regarding, path report of her D and C  noted to have complex endometrial hyperplasia without atypia  plan treat with progestagens, options discussed  Rx Provera 10 mg for six months, then recheck endometrial biopsy      Signatures   Electronically signed by : NONA Chapa ; Apr 4 2017 12:40PM EST                       (Author)

## 2018-01-18 NOTE — MISCELLANEOUS
Message   Recorded as Task   Date: 01/19/2017 01:33 PM, Created By: Jaylin Milton   Task Name: Result Follow Up   Assigned To: [facility],Team   Regarding Patient: Salma Salazar, Status: In Progress   CommentJimmie Espinosa - 19 Jan 2017 1:33 PM     TASK CREATED  Pelvic US was done for pelvic pain  Please notify patient that there is an area in the uterus where the endometrial lining looks thicker than ewould be expected in a postmenopausal woman  This is likely benign given that she has had no postmenopausl bleeding, however I would recommend that she returns to the office for an EMB to confirm that this is normal tissue  Please encourage her to schedule this at her earliest convenience  Thanks   Vaibhav Jessika - 19 Jan 2017 1:47 PM     TASK IN PROGRESS   Vaibhav Jessika - 19 Jan 2017 1:54 PM     TASK EDITED  Pt given ov for EB  2 aleve prior        Active Problems    1  Abdominal pain, LLQ (left lower quadrant) (789 04) (R10 32)   2  Backache (724 5) (M54 9)   3  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)   4  Pelvic pain in female (625 9) (R10 2)   5  Right rotator cuff tear (840 4) (M75 101)   6  Symptoms involving urinary system (788 99) (R39 9)   7  Urinary frequency (788 41) (R35 0)   8  Urinary hesitancy (788 64) (R39 11)    Current Meds   1  Acidophilus CAPS; Therapy: (Recorded:31Oct2013) to Recorded   2  DiphenhydrAMINE HCl - 25 MG Oral Capsule; Therapy: (Recorded:31Oct2013) to Recorded   3  Niacin  MG Oral Capsule Extended Release; Therapy: (Recorded:31Oct2013) to Recorded    Allergies    1  Penicillins    Signatures   Electronically signed by :  Elaine Gipson, ; Jan 19 2017  1:55PM EST                       (Author)

## 2018-01-23 VITALS
SYSTOLIC BLOOD PRESSURE: 162 MMHG | TEMPERATURE: 98.8 F | HEART RATE: 88 BPM | DIASTOLIC BLOOD PRESSURE: 94 MMHG | RESPIRATION RATE: 18 BRPM | OXYGEN SATURATION: 98 %

## 2018-02-02 ENCOUNTER — TELEPHONE (OUTPATIENT)
Dept: OBGYN CLINIC | Facility: CLINIC | Age: 63
End: 2018-02-02

## 2018-02-02 NOTE — TELEPHONE ENCOUNTER
Patient said she thought she was only to stay on the progesterone for 6 months  She wants to know if she should stay on it or discontinue

## 2018-02-05 NOTE — TELEPHONE ENCOUNTER
Spoke with pt - made an appointment for pt for her yearly 02/20 in [facility] with Joon

## 2018-02-12 PROBLEM — N89.8 VAGINAL DISCHARGE: Status: ACTIVE | Noted: 2017-01-20

## 2018-02-12 PROBLEM — R35.0 INCREASED FREQUENCY OF URINATION: Status: ACTIVE | Noted: 2017-01-16

## 2018-02-12 PROBLEM — N85.01 COMPLEX ENDOMETRIAL HYPERPLASIA: Status: ACTIVE | Noted: 2017-04-04

## 2018-02-12 PROBLEM — R93.89 THICKENED ENDOMETRIUM: Status: ACTIVE | Noted: 2017-01-20

## 2018-02-12 PROBLEM — R10.2 PELVIC PAIN IN FEMALE: Status: ACTIVE | Noted: 2017-01-16

## 2018-02-12 PROBLEM — J32.9 CHRONIC SINUSITIS: Status: ACTIVE | Noted: 2017-12-10

## 2018-02-12 PROBLEM — D49.2 NERVE SHEATH TUMOR: Status: ACTIVE | Noted: 2017-07-31

## 2018-02-12 PROBLEM — M51.16 INTERVERTEBRAL DISC DISORDER WITH RADICULOPATHY OF LUMBAR REGION: Status: ACTIVE | Noted: 2017-07-31

## 2018-02-12 PROBLEM — N95.0 POSTMENOPAUSAL BLEEDING: Status: ACTIVE | Noted: 2017-02-16

## 2018-02-12 PROBLEM — M25.562 LEFT KNEE PAIN: Status: ACTIVE | Noted: 2017-02-28

## 2018-02-12 PROBLEM — M76.892 PES ANSERINUS TENDINITIS OF LEFT LOWER EXTREMITY: Status: ACTIVE | Noted: 2017-03-15

## 2018-02-19 ENCOUNTER — TRANSCRIBE ORDERS (OUTPATIENT)
Dept: ADMINISTRATIVE | Facility: HOSPITAL | Age: 63
End: 2018-02-19

## 2018-02-19 DIAGNOSIS — J32.9 CHRONIC SINUSITIS, UNSPECIFIED LOCATION: Primary | ICD-10-CM

## 2018-02-20 ENCOUNTER — OFFICE VISIT (OUTPATIENT)
Dept: OBGYN CLINIC | Facility: MEDICAL CENTER | Age: 63
End: 2018-02-20
Payer: COMMERCIAL

## 2018-02-20 ENCOUNTER — HOSPITAL ENCOUNTER (OUTPATIENT)
Dept: RADIOLOGY | Facility: MEDICAL CENTER | Age: 63
Discharge: HOME | End: 2018-02-20
Payer: COMMERCIAL

## 2018-02-20 VITALS
WEIGHT: 199 LBS | DIASTOLIC BLOOD PRESSURE: 74 MMHG | BODY MASS INDEX: 33.97 KG/M2 | SYSTOLIC BLOOD PRESSURE: 130 MMHG | HEIGHT: 64 IN

## 2018-02-20 DIAGNOSIS — N85.01 COMPLEX ENDOMETRIAL HYPERPLASIA: ICD-10-CM

## 2018-02-20 DIAGNOSIS — Z12.31 ENCOUNTER FOR SCREENING MAMMOGRAM FOR MALIGNANT NEOPLASM OF BREAST: ICD-10-CM

## 2018-02-20 DIAGNOSIS — J32.9 CHRONIC SINUSITIS, UNSPECIFIED LOCATION: ICD-10-CM

## 2018-02-20 DIAGNOSIS — Z01.419 ENCOUNTER FOR GYNECOLOGICAL EXAMINATION (GENERAL) (ROUTINE) WITHOUT ABNORMAL FINDINGS: Primary | ICD-10-CM

## 2018-02-20 DIAGNOSIS — Z11.51 SCREENING FOR HUMAN PAPILLOMAVIRUS (HPV): ICD-10-CM

## 2018-02-20 DIAGNOSIS — N85.01 ENDOMETRIAL HYPERPLASIA WITHOUT ATYPIA, COMPLEX: ICD-10-CM

## 2018-02-20 PROBLEM — R93.89 THICKENED ENDOMETRIUM: Status: RESOLVED | Noted: 2017-01-20 | Resolved: 2018-02-20

## 2018-02-20 PROBLEM — N89.8 VAGINAL DISCHARGE: Status: RESOLVED | Noted: 2017-01-20 | Resolved: 2018-02-20

## 2018-02-20 PROBLEM — N95.0 POSTMENOPAUSAL BLEEDING: Status: RESOLVED | Noted: 2017-02-16 | Resolved: 2018-02-20

## 2018-02-20 PROCEDURE — 58100 BIOPSY OF UTERUS LINING: CPT | Performed by: OBSTETRICS & GYNECOLOGY

## 2018-02-20 PROCEDURE — 88305 TISSUE EXAM BY PATHOLOGIST: CPT | Performed by: PATHOLOGY

## 2018-02-20 PROCEDURE — G0145 SCR C/V CYTO,THINLAYER,RESCR: HCPCS | Performed by: OBSTETRICS & GYNECOLOGY

## 2018-02-20 PROCEDURE — 70486 CT MAXILLOFACIAL W/O DYE: CPT

## 2018-02-20 PROCEDURE — S0612 ANNUAL GYNECOLOGICAL EXAMINA: HCPCS | Performed by: OBSTETRICS & GYNECOLOGY

## 2018-02-20 PROCEDURE — 88305 TISSUE EXAM BY PATHOLOGIST: CPT | Performed by: OBSTETRICS & GYNECOLOGY

## 2018-02-20 PROCEDURE — 87624 HPV HI-RISK TYP POOLED RSLT: CPT | Performed by: OBSTETRICS & GYNECOLOGY

## 2018-02-20 RX ORDER — MULTIVIT-MIN/IRON/FOLIC ACID/K 18-600-40
CAPSULE ORAL DAILY
COMMUNITY

## 2018-02-20 RX ORDER — NAPROXEN SODIUM 550 MG/1
TABLET ORAL
COMMUNITY
End: 2020-01-17 | Stop reason: ALTCHOICE

## 2018-02-20 RX ORDER — TURMERIC ROOT EXTRACT 500 MG
TABLET ORAL DAILY
COMMUNITY

## 2018-02-20 RX ORDER — PANTOPRAZOLE SODIUM 40 MG/1
40 TABLET, DELAYED RELEASE ORAL DAILY
Refills: 3 | COMMUNITY
Start: 2018-01-25 | End: 2019-02-26

## 2018-02-20 RX ORDER — YOHIMBE BARK 500 MG
CAPSULE ORAL
COMMUNITY
End: 2020-01-17 | Stop reason: ALTCHOICE

## 2018-02-20 RX ORDER — DIPHENHYDRAMINE HCL 25 MG
25 CAPSULE ORAL
COMMUNITY

## 2018-02-20 RX ORDER — BIOTIN 1 MG
TABLET ORAL DAILY
COMMUNITY

## 2018-02-20 NOTE — PROGRESS NOTES
Endometrial Biopsy Procedure Note    Pre-operative Diagnosis:  Endometrial hyperplasia    Post-operative Diagnosis: same    Indications: postmenopausal bleeding    Procedure Details       The risks (including infection, bleeding, pain, and uterine perforation) and benefits of the procedure were explained to the patient and Verbal informed consent was obtained  Antibiotic prophylaxis against endocarditis was not indicated  The patient was placed in the dorsal lithotomy position  Bimanual exam showed the uterus to be in the anteroflexed position  A Graves' speculum inserted in the vagina, and the cervix prepped with povidone iodine  Endocervical curettage with a Grabielian curette was not performed  A sharp tenaculum was applied to the anterior lip of the cervix for stabilization  A sterile uterine sound was used to sound the uterus to a depth of 5 5cm  A Pipelle endometrial aspirator was used to sample the endometrium  Sample was sent for pathologic examination  Condition:  Stable    Complications:  None    Plan:    The patient was advised to call for any fever or for prolonged or severe pain or bleeding  She was advised to use none as needed for mild to moderate pain  She was advised to avoid vaginal intercourse for 48 hours or until the bleeding has completely stopped  Attending Physician Documentation:  I was present for or participated in the entire procedure, including opening and closing

## 2018-02-20 NOTE — PROGRESS NOTES
Elvin Carmona is a 65 y o  female who is here for a routine gyn exam,       patient is here for annual gyn exam   also she needs follow-up on her complex endometrial hyperplasia without atypia   she has been on progestin oral treatment for over 6 months   patient denies any vaginal bleeding   patient will go for a CT scan for her sinuses     Patient Active Problem List   Diagnosis    Abdominal pain, LLQ (left lower quadrant)    Backache    Chronic sinusitis    Complex endometrial hyperplasia    Intervertebral disc disorder with radiculopathy of lumbar region    Left knee pain    Nerve sheath tumor    Pelvic pain in female    Pes anserinus tendinitis of left lower extremity    Symptoms involving urinary system    Increased frequency of urination    Hesitancy of micturition         Social History     Social History    Marital status: /Civil Union     Spouse name: N/A    Number of children: N/A    Years of education: N/A     Occupational History    [facility] [facility] [employer]     Social History Main Topics    Smoking status: Former Smoker    Smokeless tobacco: Never Used    Alcohol use Yes      Comment: SOCIAL DRINKER     Drug use: No    Sexual activity: Yes     Partners: Male     Other Topics Concern    Not on file     Social History Narrative    Always uses seatbelt    Caffeine use    Daily coffee consumption (2 cups/day)    Exercising erratically    Feels safe at home      Family History   Problem Relation Age of Onset    Anemia Mother     Hypertension Mother     Hypertension Father     Prostate cancer Father     Anemia Sister     Thyroid cancer Sister     Coronary artery disease Maternal Grandmother     Lung cancer Maternal Grandfather      Past Medical History:   Diagnosis Date    Candidal vulvovaginitis     Cervical polyp     Herpes simplex     Herpes simplex infection     Mild dysplasia of cervix (YOLANDE I)     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  Normal delivery     1985 daughter    Postmenopausal bleeding 2017    Rotator cuff tear, right     SVT (supraventricular tachycardia) (HCC)     Thickened endometrium 2017    UTI (urinary tract infection)     Vaginal discharge 2017       Current Outpatient Prescriptions:     Ascorbic Acid (VITAMIN C) 500 MG CAPS, Take by mouth, Disp: , Rfl:     B Complex Vitamins (B COMPLEX 50 PO), Take by mouth, Disp: , Rfl:     Cholecalciferol (VITAMIN D3) 1000 units CAPS, Take by mouth, Disp: , Rfl:     diphenhydrAMINE (BENADRYL) 25 mg capsule, Take by mouth, Disp: , Rfl:     Lactobacillus (ACIDOPHILUS) 100 MG CAPS, Take by mouth, Disp: , Rfl:     naproxen sodium (ANAPROX) 550 mg tablet, Take by mouth, Disp: , Rfl:     pantoprazole (PROTONIX) 40 mg tablet, Take 40 mg by mouth daily, Disp: , Rfl: 3    RaNITidine HCl (ZANTAC PO), Take by mouth, Disp: , Rfl:     Turmeric 500 MG TABS, Take by mouth, Disp: , Rfl:     Review of Systems   Constitutional: Negative for fatigue  Respiratory: Negative for shortness of breath  Cardiovascular: Negative for chest pain and palpitations  Gastrointestinal: Negative for abdominal distention, abdominal pain and constipation  Genitourinary: Negative for dyspareunia, frequency, hematuria and pelvic pain  Bladder control: stress incontinence no                             urgency   no    2 Para 2002      denies complaints with intercourse  Gynecologic History  No LMP recorded  Her birth control is: none  Last Pap:   Results were: normal  Last mammogram: 2017   Results were: normal        The following portions of the patient's history were reviewed and updated as appropriate: allergies, current medications, past family history, past medical history, past social history, past surgical history and problem list     Review of Systems  Review of Systems     Objective     /74   Ht 5' 3 5" (1 613 m)   Wt 90 3 kg (199 lb)   BMI 34 70 kg/m² General Appearance:    Alert, cooperative, no distress, appears stated age                               Lungs:     Clear to auscultation bilaterally, respirations unlabored        Heart:    Regular rate and rhythm, S1 and S2 normal, no murmur, rub   or gallop   Breast Exam:  normal nipple, no mass, no nipple discharge, no skin change, no tenderness   Abdomen:     Soft, non-tender, bowel sounds active all four quadrants,     no masses, no organomegaly     Genitalia      :Vulva: normal, no lesions  Vagina: normal mucosa  Cervix: normal appearance, thin prep PAP obtained and endometrial biopsy done with ease  Uterus: normal size, anteverted  Adnexa: normal adnexa and no mass, fullness, tenderness     Rectal:   normal   Extremities:   Extremities normal, atraumatic, no cyanosis or edema       Skin:   Skin color, texture, turgor normal, no rashes or lesions   Lymph nodes:   Cervical, supraclavicular, and axillary nodes normal             Assessment:  Encounter Diagnoses   Name Primary?  Encounter for gynecological examination (general) (routine) without abnormal findings Yes    Screening for human papillomavirus (HPV)     Encounter for screening mammogram for malignant neoplasm of breast            Normal gynecological evalation and well visit  Ramiro Giordano Await report of her endometrial biopsy for her history of endometrial hyperplasia  Plan     Education reviewed: none

## 2018-02-20 NOTE — PROGRESS NOTES
Patient presents today for a yearly GYN exam   She had an EB 1/20/2017 And a D+C Hysteroscopy 3/27/17   She last had a Colonoscopy in 2014

## 2018-02-22 LAB — HPV RRNA GENITAL QL NAA+PROBE: NORMAL

## 2018-02-23 LAB
LAB AP GYN PRIMARY INTERPRETATION: NORMAL
Lab: NORMAL

## 2018-02-26 ENCOUNTER — TELEPHONE (OUTPATIENT)
Dept: OBGYN CLINIC | Facility: CLINIC | Age: 63
End: 2018-02-26

## 2018-08-09 ENCOUNTER — TRANSCRIBE ORDERS (OUTPATIENT)
Dept: ADMINISTRATIVE | Facility: HOSPITAL | Age: 63
End: 2018-08-09

## 2018-08-09 ENCOUNTER — APPOINTMENT (OUTPATIENT)
Dept: LAB | Facility: MEDICAL CENTER | Age: 63
End: 2018-08-09
Payer: COMMERCIAL

## 2018-08-09 DIAGNOSIS — B35.1 DERMATOPHYTOSIS OF NAIL: Primary | ICD-10-CM

## 2018-08-09 PROCEDURE — 80076 HEPATIC FUNCTION PANEL: CPT

## 2018-08-09 PROCEDURE — 36415 COLL VENOUS BLD VENIPUNCTURE: CPT

## 2018-08-10 LAB
ALBUMIN SERPL BCP-MCNC: 4.4 G/DL (ref 3.5–5)
ALP SERPL-CCNC: 83 U/L (ref 46–116)
ALT SERPL W P-5'-P-CCNC: 32 U/L (ref 12–78)
AST SERPL W P-5'-P-CCNC: 21 U/L (ref 5–45)
BILIRUB DIRECT SERPL-MCNC: 0.11 MG/DL (ref 0–0.2)
BILIRUB SERPL-MCNC: 0.4 MG/DL (ref 0.2–1)
PROT SERPL-MCNC: 7.6 G/DL (ref 6.4–8.2)

## 2019-02-26 ENCOUNTER — ANNUAL EXAM (OUTPATIENT)
Dept: OBGYN CLINIC | Facility: MEDICAL CENTER | Age: 64
End: 2019-02-26
Payer: COMMERCIAL

## 2019-02-26 VITALS — WEIGHT: 199 LBS | BODY MASS INDEX: 34.7 KG/M2 | DIASTOLIC BLOOD PRESSURE: 76 MMHG | SYSTOLIC BLOOD PRESSURE: 144 MMHG

## 2019-02-26 DIAGNOSIS — Z12.39 SCREENING FOR MALIGNANT NEOPLASM OF BREAST: ICD-10-CM

## 2019-02-26 DIAGNOSIS — Z01.419 ENCOUNTER FOR GYNECOLOGICAL EXAMINATION (GENERAL) (ROUTINE) WITHOUT ABNORMAL FINDINGS: Primary | ICD-10-CM

## 2019-02-26 PROCEDURE — 99396 PREV VISIT EST AGE 40-64: CPT | Performed by: PHYSICIAN ASSISTANT

## 2019-02-26 NOTE — PROGRESS NOTES
Assessment/Plan  Problem List Items Addressed This Visit        Other    Encounter for gynecological examination (general) (routine) without abnormal findings - Primary     Annual exam performed  mammo rx given  RTO 1 year           Other Visit Diagnoses     Screening for malignant neoplasm of breast        Relevant Orders    Mammo screening bilateral w cad          Subjective   Rachel Lovelace is a 59 y o  female who presents for annual GYN exam  She denies any changes in Medical, Surgical or Family histories  No menses, and she denies postmenopausal bleeding, spotting, or discharge  She denies any hot flashes or night sweats  She reports that she is sexually active with 1 partner  She denies any pain or dryness with intercourse  Last Pap smear: 2018  Regular self breast exam: yes  Last mammogram: 2018 per patient, not in imaging, she will call for report to be faxed  Last Colonoscopy:   Family history of uterine or ovarian cancer: no  Family history of breast cancer: yes - mother dx age 80  Family history of colon cancer: no    Menstrual History:  OB History        2    Para   2    Term                AB        Living           SAB        TAB        Ectopic        Multiple        Live Births                    No LMP recorded  Patient is postmenopausal        The following portions of the patient's history were reviewed and updated as appropriate: allergies, current medications, past family history, past medical history, past social history, past surgical history and problem list     Review of Systems  Review of Systems   Constitutional: Negative for chills and fever  Respiratory: Negative for shortness of breath  Cardiovascular: Negative for chest pain  Gastrointestinal: Negative for abdominal pain  Genitourinary: Negative for dysuria, pelvic pain, vaginal bleeding, vaginal discharge and vaginal pain  Negative for breast pain or lumps    Negative for stress urinary incontinence  Neurological: Negative for headaches  Objective   /76 (BP Location: Right arm)   Wt 90 3 kg (199 lb)   BMI 34 70 kg/m²     Physical Exam   Constitutional: She is oriented to person, place, and time  She appears well-developed and well-nourished  Neck: No thyromegaly present  Cardiovascular: Normal rate and regular rhythm  Pulmonary/Chest: Effort normal and breath sounds normal  Right breast exhibits no mass, no nipple discharge, no skin change and no tenderness  Left breast exhibits no mass, no nipple discharge, no skin change and no tenderness  Abdominal: Soft  There is no tenderness  There is no rebound and no guarding  Genitourinary: There is no rash or lesion on the right labia  There is no rash or lesion on the left labia  Uterus is not enlarged and not tender  Cervix exhibits no motion tenderness and no discharge  Right adnexum displays no mass and no tenderness  Left adnexum displays no mass and no tenderness  No bleeding in the vagina  No vaginal discharge found  Neurological: She is alert and oriented to person, place, and time  Psychiatric: She has a normal mood and affect  Nursing note and vitals reviewed

## 2019-02-27 PROBLEM — Z01.419 ENCOUNTER FOR GYNECOLOGICAL EXAMINATION (GENERAL) (ROUTINE) WITHOUT ABNORMAL FINDINGS: Status: ACTIVE | Noted: 2019-02-27

## 2019-03-29 ENCOUNTER — OFFICE VISIT (OUTPATIENT)
Dept: URGENT CARE | Facility: MEDICAL CENTER | Age: 64
End: 2019-03-29
Payer: COMMERCIAL

## 2019-03-29 VITALS
SYSTOLIC BLOOD PRESSURE: 146 MMHG | OXYGEN SATURATION: 100 % | BODY MASS INDEX: 34.38 KG/M2 | RESPIRATION RATE: 20 BRPM | HEART RATE: 77 BPM | HEIGHT: 64 IN | TEMPERATURE: 97.7 F | DIASTOLIC BLOOD PRESSURE: 80 MMHG | WEIGHT: 201.4 LBS

## 2019-03-29 DIAGNOSIS — M54.9 UPPER BACK PAIN: Primary | ICD-10-CM

## 2019-03-29 PROCEDURE — 99213 OFFICE O/P EST LOW 20 MIN: CPT | Performed by: PHYSICIAN ASSISTANT

## 2019-03-29 RX ORDER — METHOCARBAMOL 500 MG/1
500 TABLET, FILM COATED ORAL 4 TIMES DAILY
Qty: 20 TABLET | Refills: 0 | Status: SHIPPED | OUTPATIENT
Start: 2019-03-29 | End: 2020-01-17 | Stop reason: ALTCHOICE

## 2019-03-29 RX ORDER — NAPROXEN 500 MG/1
500 TABLET ORAL 2 TIMES DAILY WITH MEALS
Qty: 20 TABLET | Refills: 0 | Status: SHIPPED | OUTPATIENT
Start: 2019-03-29 | End: 2020-01-17 | Stop reason: ALTCHOICE

## 2019-03-29 NOTE — PATIENT INSTRUCTIONS
Upper back pain  Robaxin - (may become drowsy)  Naprosyn twice daily as needed  Follow up with PCP in 3-5 days  Proceed to  ER if symptoms worsen  Upper Back Exercises   AMBULATORY CARE:   Upper back exercises  help heal and strengthen your back muscles and prevent another injury  Ask your healthcare provider if you need to see a physical therapist for more advanced exercises  · Do the exercises on a mat or firm surface  (not on a bed) to support your spine  · Move slowly and smoothly  Avoid fast or jerky motions  · Breathe normally  Do not hold your breath  · Stop if you feel pain  It is normal to feel some discomfort at first  Regular exercise will help decrease your discomfort over time  Seek care immediately if:   · You have severe pain that prevents you from moving  Contact your healthcare provider if:   · Your pain becomes worse  · You have new pain  · You have questions or concerns about your condition, care, or exercise program   Perform upper back exercises safely:  Ask your healthcare provider which of the following exercises are best for you and how often to do them  · Head rolls:  Sit in a chair or stand  Bring your chin toward your chest and roll your head to the right  Your ear should be over your shoulder  Hold this position for 5 seconds  Roll your head back toward your chest and to the left  Your ear should be over your left shoulder  Hold this position for 5 seconds  Next, roll your head back slowly in a clockwise [place] and repeat 3 times  Do 3 sets of head rolls  · Scapular squeeze:  Sit or stand with your arms at your sides  Squeeze your shoulder blades together and hold for 3 seconds  Relax and repeat 3 times  · Pectoralis stretch:   a doorway  Lift your hands and place them on each side of the door frame or wall slightly higher than your head  Lean forward slowly until you feel a gentle stretch  Hold for 15 seconds   Repeat 3 times, or as directed  · Cat and camel exercise:  Place your hands and knees on the floor  Arch your back upward toward the ceiling and lower your head  Round out your spine as much as you can  Hold for 5 seconds  Lift your head upward and push your chest downward toward the floor  Hold for 5 seconds  Do 3 sets or as directed  · Bird dog:  Place your hands and knees on the floor  Keep your wrists directly below your shoulders and your knees directly below your hips  Pull your belly button in toward your spine  Do not flatten or arch your back  Tighten your abdominal muscles  Raise one arm straight out so that it is aligned with your head  Next, raise the leg opposite your arm  Hold this position for 15 seconds  Lower your arm and leg slowly and change sides  Do 5 sets  © 2017 [employer] [facility] [facility] Information is for End User's use only and may not be sold, redistributed or otherwise used for commercial purposes  All illustrations and images included in CareNotes® are the copyrighted property of A D A M , Inc  or Brigido Harris  The above information is an  only  It is not intended as medical advice for individual conditions or treatments  Talk to your doctor, nurse or pharmacist before following any medical regimen to see if it is safe and effective for you

## 2019-06-04 ENCOUNTER — TRANSCRIBE ORDERS (OUTPATIENT)
Dept: ADMINISTRATIVE | Facility: HOSPITAL | Age: 64
End: 2019-06-04

## 2019-06-04 ENCOUNTER — APPOINTMENT (OUTPATIENT)
Dept: LAB | Facility: MEDICAL CENTER | Age: 64
End: 2019-06-04
Payer: COMMERCIAL

## 2019-06-04 DIAGNOSIS — A08.39 OTHER VIRAL ENTERITIS: Primary | ICD-10-CM

## 2019-06-04 LAB
BASOPHILS # BLD AUTO: 0.05 THOUSANDS/ΜL (ref 0–0.1)
BASOPHILS NFR BLD AUTO: 1 % (ref 0–1)
EOSINOPHIL # BLD AUTO: 0.28 THOUSAND/ΜL (ref 0–0.61)
EOSINOPHIL NFR BLD AUTO: 4 % (ref 0–6)
ERYTHROCYTE [DISTWIDTH] IN BLOOD BY AUTOMATED COUNT: 12.9 % (ref 11.6–15.1)
HCT VFR BLD AUTO: 38 % (ref 34.8–46.1)
HGB BLD-MCNC: 12.5 G/DL (ref 11.5–15.4)
IMM GRANULOCYTES # BLD AUTO: 0.02 THOUSAND/UL (ref 0–0.2)
IMM GRANULOCYTES NFR BLD AUTO: 0 % (ref 0–2)
LYMPHOCYTES # BLD AUTO: 2.05 THOUSANDS/ΜL (ref 0.6–4.47)
LYMPHOCYTES NFR BLD AUTO: 29 % (ref 14–44)
MCH RBC QN AUTO: 30.2 PG (ref 26.8–34.3)
MCHC RBC AUTO-ENTMCNC: 32.9 G/DL (ref 31.4–37.4)
MCV RBC AUTO: 92 FL (ref 82–98)
MONOCYTES # BLD AUTO: 0.54 THOUSAND/ΜL (ref 0.17–1.22)
MONOCYTES NFR BLD AUTO: 8 % (ref 4–12)
NEUTROPHILS # BLD AUTO: 4.06 THOUSANDS/ΜL (ref 1.85–7.62)
NEUTS SEG NFR BLD AUTO: 58 % (ref 43–75)
NRBC BLD AUTO-RTO: 0 /100 WBCS
PLATELET # BLD AUTO: 318 THOUSANDS/UL (ref 149–390)
PMV BLD AUTO: 10.8 FL (ref 8.9–12.7)
RBC # BLD AUTO: 4.14 MILLION/UL (ref 3.81–5.12)
WBC # BLD AUTO: 7 THOUSAND/UL (ref 4.31–10.16)

## 2019-06-04 PROCEDURE — 85025 COMPLETE CBC W/AUTO DIFF WBC: CPT | Performed by: FAMILY MEDICINE

## 2019-06-04 PROCEDURE — 36415 COLL VENOUS BLD VENIPUNCTURE: CPT | Performed by: FAMILY MEDICINE

## 2019-06-04 PROCEDURE — 80048 BASIC METABOLIC PNL TOTAL CA: CPT | Performed by: FAMILY MEDICINE

## 2019-06-05 ENCOUNTER — APPOINTMENT (OUTPATIENT)
Dept: LAB | Facility: MEDICAL CENTER | Age: 64
End: 2019-06-05
Payer: COMMERCIAL

## 2019-06-05 ENCOUNTER — TRANSCRIBE ORDERS (OUTPATIENT)
Dept: ADMINISTRATIVE | Facility: HOSPITAL | Age: 64
End: 2019-06-05

## 2019-06-05 DIAGNOSIS — A08.39 OTHER VIRAL ENTERITIS: Primary | ICD-10-CM

## 2019-06-05 DIAGNOSIS — A08.39 OTHER VIRAL ENTERITIS: ICD-10-CM

## 2019-06-05 LAB
ANION GAP SERPL CALCULATED.3IONS-SCNC: 4 MMOL/L (ref 4–13)
BUN SERPL-MCNC: 19 MG/DL (ref 5–25)
CALCIUM SERPL-MCNC: 8.5 MG/DL (ref 8.3–10.1)
CHLORIDE SERPL-SCNC: 107 MMOL/L (ref 100–108)
CO2 SERPL-SCNC: 27 MMOL/L (ref 21–32)
CREAT SERPL-MCNC: 0.75 MG/DL (ref 0.6–1.3)
GFR SERPL CREATININE-BSD FRML MDRD: 86 ML/MIN/1.73SQ M
GLUCOSE SERPL-MCNC: 102 MG/DL (ref 65–140)
POTASSIUM SERPL-SCNC: 4.4 MMOL/L (ref 3.5–5.3)
SODIUM SERPL-SCNC: 138 MMOL/L (ref 136–145)

## 2019-06-05 PROCEDURE — 87505 NFCT AGENT DETECTION GI: CPT

## 2019-06-06 LAB
C DIFF TOX GENS STL QL NAA+PROBE: NORMAL
CAMPYLOBACTER DNA SPEC NAA+PROBE: NORMAL
SALMONELLA DNA SPEC QL NAA+PROBE: NORMAL
SHIGA TOXIN STX GENE SPEC NAA+PROBE: NORMAL
SHIGELLA DNA SPEC QL NAA+PROBE: NORMAL

## 2020-01-06 ENCOUNTER — CONSULT (OUTPATIENT)
Dept: PLASTIC SURGERY | Facility: CLINIC | Age: 65
End: 2020-01-06
Payer: COMMERCIAL

## 2020-01-06 VITALS
DIASTOLIC BLOOD PRESSURE: 80 MMHG | SYSTOLIC BLOOD PRESSURE: 149 MMHG | TEMPERATURE: 97.9 F | BODY MASS INDEX: 35.48 KG/M2 | HEART RATE: 78 BPM | RESPIRATION RATE: 18 BRPM | HEIGHT: 64 IN | WEIGHT: 207.8 LBS

## 2020-01-06 DIAGNOSIS — C44.319 BASAL CELL CARCINOMA OF CHIN: Primary | ICD-10-CM

## 2020-01-06 PROCEDURE — 99244 OFF/OP CNSLTJ NEW/EST MOD 40: CPT | Performed by: PHYSICIAN ASSISTANT

## 2020-01-06 NOTE — H&P (VIEW-ONLY)
Assessment/Plan:  Richard is a 62year old female who presents in consultation for reconstruction of a MOHs defect of the chin for treatment of a basal cell carcinoma  She is referred to us by Dr Loni Del Rio  Please see HPI  I discussed with her reconstruction of a Mohs defect with complex closure verses local tissue flap  She understood and agreed  We discussed with the patient the options, benefits, and risks of surgery such as anesthesia, bleeding, infection, scarring and the need for additional procedures  Consent was obtained and all questions answered to her satisfaction  We will plan for surgery at her earliest convenience  Seen with Dr Yanira Fang as well  Diagnoses and all orders for this visit:    Basal cell carcinoma of chin          Subjective:      Patient ID: Katherine Stanley is a 62 y o  female  HPI   She reports having a history for basal cell carcinoma  She has had Mohs procedure before  The lesion on her left chin has been present for approximately 2 years  She reported that it did increase in size and begin to bleed during that time  She had a biopsy done with her dermatologist who confirmed basal cell  The following portions of the patient's history were reviewed and updated as appropriate: She  has a past medical history of Candidal vulvovaginitis, Cervical polyp, Herpes simplex, Herpes simplex infection, Mild dysplasia of cervix (YOLANDE I), Neoplasm of uncertain behavior of kidney (10/31/2013), Normal delivery, Postmenopausal bleeding (2017), Rotator cuff tear, right, SVT (supraventricular tachycardia) ([facility] [address] ), Thickened endometrium (2017), UTI (urinary tract infection), and Vaginal discharge (2017)  She  has a past surgical history that includes Appendectomy; Endometrial ablation w/ novasure;  section; Colonoscopy; Cystoscopy; Dilation and curettage of uterus; Hysteroscopy; Ovarian cyst removal; Rotator cuff repair ();  Salpingoophorectomy (Right, ); Tonsillectomy; Endometrial biopsy; Oophorectomy (Right); and Cervical biopsy w/ loop electrode excision (1994)  Her family history includes Anemia in her mother and sister; Coronary artery disease in her maternal grandmother; Hypertension in her father and mother; Lung cancer in her maternal grandfather; Prostate cancer in her father; Thyroid cancer in her sister  She  reports that she quit smoking about 36 years ago  Her smoking use included cigarettes  She quit after 10 00 years of use  She has never used smokeless tobacco  She reports that she drinks alcohol  She reports that she does not use drugs       Review of Systems   HENT: Negative for hearing loss  Eyes: Negative for visual disturbance  Respiratory: Negative for shortness of breath  Cardiovascular: Negative for chest pain  Gastrointestinal: Negative for abdominal pain, blood in stool, constipation, diarrhea, nausea and vomiting  Genitourinary: Negative for hematuria  Musculoskeletal: Negative for gait problem  Skin:        As per HPI  Neurological: Negative for seizures and headaches  Hematological: Does not bruise/bleed easily  Psychiatric/Behavioral: The patient is not nervous/anxious  Objective: There were no vitals taken for this visit  Physical Exam   Constitutional: She is oriented to person, place, and time  She appears well-developed and well-nourished  No distress  HENT:   Head: Normocephalic and atraumatic  Eyes: Pupils are equal, round, and reactive to light  EOM are normal  No scleral icterus  Neck: Neck supple  No tracheal deviation present  No thyromegaly present  Cardiovascular: Normal rate and regular rhythm  Exam reveals no gallop and no friction rub  No murmur heard  Pulmonary/Chest: Effort normal and breath sounds normal  She has no wheezes  She has no rales  Abdominal: Soft  Bowel sounds are normal  She exhibits no distension  There is no tenderness   There is no rebound and no guarding  Musculoskeletal: Normal range of motion  Lymphadenopathy:     She has no cervical adenopathy  Neurological: She is alert and oriented to person, place, and time  No cranial nerve deficit  Skin:   Left shin biopsy site noted  The area measures approximately 8 mm it is located just inferior and medial to her frown lines  Please see photo  Psychiatric: She has a normal mood and affect

## 2020-01-06 NOTE — PROGRESS NOTES
Assessment/Plan:  Carmen Bosch is a 62year old female who presents in consultation for reconstruction of a MOHs defect of the chin for treatment of a basal cell carcinoma  She is referred to us by Dr Shah March  Please see HPI  I discussed with her reconstruction of a Mohs defect with complex closure verses local tissue flap  She understood and agreed  We discussed with the patient the options, benefits, and risks of surgery such as anesthesia, bleeding, infection, scarring and the need for additional procedures  Consent was obtained and all questions answered to her satisfaction  We will plan for surgery at her earliest convenience  Seen with Dr Wm Villagomez as well  Diagnoses and all orders for this visit:    Basal cell carcinoma of chin          Subjective:      Patient ID: David Tate is a 62 y o  female  HPI   She reports having a history for basal cell carcinoma  She has had Mohs procedure before  The lesion on her left chin has been present for approximately 2 years  She reported that it did increase in size and begin to bleed during that time  She had a biopsy done with her dermatologist who confirmed basal cell  The following portions of the patient's history were reviewed and updated as appropriate: She  has a past medical history of Candidal vulvovaginitis, Cervical polyp, Herpes simplex, Herpes simplex infection, Mild dysplasia of cervix (YOLANDE I), Neoplasm of uncertain behavior of kidney (10/31/2013), Normal delivery, Postmenopausal bleeding (2017), Rotator cuff tear, right, SVT (supraventricular tachycardia) ([facility] [address] ), Thickened endometrium (2017), UTI (urinary tract infection), and Vaginal discharge (2017)  She  has a past surgical history that includes Appendectomy; Endometrial ablation w/ novasure;  section; Colonoscopy; Cystoscopy; Dilation and curettage of uterus; Hysteroscopy; Ovarian cyst removal; Rotator cuff repair ();  Salpingoophorectomy (Right, ); Tonsillectomy; Endometrial biopsy; Oophorectomy (Right); and Cervical biopsy w/ loop electrode excision (1994)  Her family history includes Anemia in her mother and sister; Coronary artery disease in her maternal grandmother; Hypertension in her father and mother; Lung cancer in her maternal grandfather; Prostate cancer in her father; Thyroid cancer in her sister  She  reports that she quit smoking about 36 years ago  Her smoking use included cigarettes  She quit after 10 00 years of use  She has never used smokeless tobacco  She reports that she drinks alcohol  She reports that she does not use drugs       Review of Systems   HENT: Negative for hearing loss  Eyes: Negative for visual disturbance  Respiratory: Negative for shortness of breath  Cardiovascular: Negative for chest pain  Gastrointestinal: Negative for abdominal pain, blood in stool, constipation, diarrhea, nausea and vomiting  Genitourinary: Negative for hematuria  Musculoskeletal: Negative for gait problem  Skin:        As per HPI  Neurological: Negative for seizures and headaches  Hematological: Does not bruise/bleed easily  Psychiatric/Behavioral: The patient is not nervous/anxious  Objective: There were no vitals taken for this visit  Physical Exam   Constitutional: She is oriented to person, place, and time  She appears well-developed and well-nourished  No distress  HENT:   Head: Normocephalic and atraumatic  Eyes: Pupils are equal, round, and reactive to light  EOM are normal  No scleral icterus  Neck: Neck supple  No tracheal deviation present  No thyromegaly present  Cardiovascular: Normal rate and regular rhythm  Exam reveals no gallop and no friction rub  No murmur heard  Pulmonary/Chest: Effort normal and breath sounds normal  She has no wheezes  She has no rales  Abdominal: Soft  Bowel sounds are normal  She exhibits no distension  There is no tenderness   There is no rebound and no guarding  Musculoskeletal: Normal range of motion  Lymphadenopathy:     She has no cervical adenopathy  Neurological: She is alert and oriented to person, place, and time  No cranial nerve deficit  Skin:   Left shin biopsy site noted  The area measures approximately 8 mm it is located just inferior and medial to her frown lines  Please see photo  Psychiatric: She has a normal mood and affect

## 2020-01-07 PROBLEM — C44.319 BASAL CELL CARCINOMA OF CHIN: Status: ACTIVE | Noted: 2020-01-07

## 2020-01-17 NOTE — PRE-PROCEDURE INSTRUCTIONS
Pre-Surgery Instructions:   Medication Instructions    Ascorbic Acid (VITAMIN C) 500 MG CAPS Instructed patient per Anesthesia Guidelines   ASHWAGANDHA PO Instructed patient per Anesthesia Guidelines   B Complex Vitamins (B COMPLEX 50 PO) Instructed patient per Anesthesia Guidelines   Boswellia Celina (BOSWELLIA PO) Instructed patient per Anesthesia Guidelines   Cholecalciferol (VITAMIN D3) 1000 units CAPS Instructed patient per Anesthesia Guidelines   Coenzyme Q10 (CO Q-10 PO) Instructed patient per Anesthesia Guidelines   diphenhydrAMINE (BENADRYL) 25 mg capsule Instructed patient per Anesthesia Guidelines   FENUGREEK PO Instructed patient per Anesthesia Guidelines   Multiple Vitamins-Minerals (ZINC PO) Instructed patient per Anesthesia Guidelines   Probiotic Product (PROBIOTIC DAILY PO) Instructed patient per Anesthesia Guidelines   Turmeric 500 MG TABS Instructed patient per Anesthesia Guidelines  Before your operation, you play an important role in decreasing your risk for infection by washing with special antiseptic soap  This is an effective way to reduce bacteria on the skin which may help to prevent infections at the surgical site  Please read the following directions in advance  1  In the week before your operation purchase a 4 ounce bottle of antiseptic soap containing chlorhexidine gluconate 4%  Some brand names include: Aplicare, Endure, and Hibiclens  The cost is usually less than $5 00  · For your convenience, the [employer] carries the soap  · It may also be available at your doctor's office or pre-admission testing center, and at most retail pharmacies  · If you are allergic or sensitive to soaps containing chlorhexidine gluconate (CHG), please let your doctor know so another antiseptic soap can be suggested  · CHG antiseptic soap is for external use only    2      The day before your operation follow these directions carefully to get ready   · Place clean lines (sheets) on your bed; you should sleep on clean sheets after your evening shower  · Get clean towels and washcloths ready - you need enough for 2 showers  · Set aside clean underwear, pajamas, and clothing to wear after the shower  Reminders:  · DO NOT use any other soap or body rinse on your skin during or after the antiseptic showers  · DO NOT use lotion , powder, deodorant, or perfume/aftershave of any kind on your skin after your antiseptic shower  · DO NOT shave any body parts in the 24 hours/the day before your operation  · DO NOT get the antiseptic soap in your eyes, ears, nose, mouth, or vaginal area  3      You will need to shower the night before AND the morning of your Surgery  Shower 1:  · The evening before your operation, take the fist shower  · First, shampoo your hair with regular shampoo and rinse it completely before you use the anitseptic soap  After washing and rinsing your hair, rinse your body  · Next, use a clean wash cloth to apply the antiseptic soap and wash your body from the neck down to your toes using 1/2 bottle of the antiseptic soap  You will use the other 1/2 bottle for the second shower  · Clean the area where your incision will be; later this area well for about 2 minutes  · If you ar having head or neck surgery, wash areas with the antiseptic soap  · Rinse yourself completely with running water  · Use a clean towel to dry off  · Wear clean underwear and clothing/pajamas  Shower 2:  · The Morning of your operation, take the second shower following the same steps as Shower 1 using the second 1/2 of the bottle of antiseptic soap  · Use clean cloths and towels to was and dry yourself off  · Wear clean underwear and clothing  You will receive a phone call from hospital for arrival time  Please call surgeons office if any changes in your condition    Wear easy on/off clothing; consider type of surgery;  valuables and jewelry please keep at home  Patient will use dial soap for shower  Please secure transportation     Follow pre surgery showering or cleaning instructions as  Reviewed by nurse or surgeons office      Questions answered and concerns addressed

## 2020-01-20 ENCOUNTER — CLINICAL SUPPORT (OUTPATIENT)
Dept: URGENT CARE | Facility: MEDICAL CENTER | Age: 65
End: 2020-01-20
Payer: COMMERCIAL

## 2020-01-20 ENCOUNTER — APPOINTMENT (OUTPATIENT)
Dept: LAB | Facility: MEDICAL CENTER | Age: 65
End: 2020-01-20
Payer: COMMERCIAL

## 2020-01-20 DIAGNOSIS — C44.319 BASAL CELL CARCINOMA OF CHIN: ICD-10-CM

## 2020-01-20 LAB
ANION GAP SERPL CALCULATED.3IONS-SCNC: 5 MMOL/L (ref 4–13)
BASOPHILS # BLD AUTO: 0.07 THOUSANDS/ΜL (ref 0–0.1)
BASOPHILS NFR BLD AUTO: 1 % (ref 0–1)
BUN SERPL-MCNC: 20 MG/DL (ref 5–25)
CALCIUM SERPL-MCNC: 8.8 MG/DL (ref 8.3–10.1)
CHLORIDE SERPL-SCNC: 109 MMOL/L (ref 100–108)
CO2 SERPL-SCNC: 27 MMOL/L (ref 21–32)
CREAT SERPL-MCNC: 0.87 MG/DL (ref 0.6–1.3)
EOSINOPHIL # BLD AUTO: 0.41 THOUSAND/ΜL (ref 0–0.61)
EOSINOPHIL NFR BLD AUTO: 7 % (ref 0–6)
ERYTHROCYTE [DISTWIDTH] IN BLOOD BY AUTOMATED COUNT: 13 % (ref 11.6–15.1)
GFR SERPL CREATININE-BSD FRML MDRD: 71 ML/MIN/1.73SQ M
GLUCOSE P FAST SERPL-MCNC: 79 MG/DL (ref 65–99)
HCT VFR BLD AUTO: 38.3 % (ref 34.8–46.1)
HGB BLD-MCNC: 12.2 G/DL (ref 11.5–15.4)
IMM GRANULOCYTES # BLD AUTO: 0.03 THOUSAND/UL (ref 0–0.2)
IMM GRANULOCYTES NFR BLD AUTO: 1 % (ref 0–2)
LYMPHOCYTES # BLD AUTO: 2.32 THOUSANDS/ΜL (ref 0.6–4.47)
LYMPHOCYTES NFR BLD AUTO: 38 % (ref 14–44)
MCH RBC QN AUTO: 29.8 PG (ref 26.8–34.3)
MCHC RBC AUTO-ENTMCNC: 31.9 G/DL (ref 31.4–37.4)
MCV RBC AUTO: 94 FL (ref 82–98)
MONOCYTES # BLD AUTO: 0.5 THOUSAND/ΜL (ref 0.17–1.22)
MONOCYTES NFR BLD AUTO: 8 % (ref 4–12)
NEUTROPHILS # BLD AUTO: 2.85 THOUSANDS/ΜL (ref 1.85–7.62)
NEUTS SEG NFR BLD AUTO: 45 % (ref 43–75)
NRBC BLD AUTO-RTO: 0 /100 WBCS
PLATELET # BLD AUTO: 308 THOUSANDS/UL (ref 149–390)
PMV BLD AUTO: 10.3 FL (ref 8.9–12.7)
POTASSIUM SERPL-SCNC: 4.2 MMOL/L (ref 3.5–5.3)
RBC # BLD AUTO: 4.09 MILLION/UL (ref 3.81–5.12)
SODIUM SERPL-SCNC: 141 MMOL/L (ref 136–145)
WBC # BLD AUTO: 6.18 THOUSAND/UL (ref 4.31–10.16)

## 2020-01-20 PROCEDURE — 93005 ELECTROCARDIOGRAM TRACING: CPT

## 2020-01-20 PROCEDURE — 80048 BASIC METABOLIC PNL TOTAL CA: CPT

## 2020-01-20 PROCEDURE — 36415 COLL VENOUS BLD VENIPUNCTURE: CPT

## 2020-01-20 PROCEDURE — 85025 COMPLETE CBC W/AUTO DIFF WBC: CPT

## 2020-01-21 LAB
ATRIAL RATE: 72 BPM
P AXIS: 54 DEGREES
PR INTERVAL: 166 MS
QRS AXIS: 20 DEGREES
QRSD INTERVAL: 86 MS
QT INTERVAL: 424 MS
QTC INTERVAL: 464 MS
T WAVE AXIS: 33 DEGREES
VENTRICULAR RATE: 72 BPM

## 2020-01-21 PROCEDURE — 93010 ELECTROCARDIOGRAM REPORT: CPT | Performed by: INTERNAL MEDICINE

## 2020-01-23 ENCOUNTER — ANESTHESIA (OUTPATIENT)
Dept: PERIOP | Facility: HOSPITAL | Age: 65
End: 2020-01-23
Payer: COMMERCIAL

## 2020-01-23 ENCOUNTER — ANESTHESIA EVENT (OUTPATIENT)
Dept: PERIOP | Facility: HOSPITAL | Age: 65
End: 2020-01-23
Payer: COMMERCIAL

## 2020-01-23 ENCOUNTER — HOSPITAL ENCOUNTER (OUTPATIENT)
Facility: HOSPITAL | Age: 65
Setting detail: OUTPATIENT SURGERY
Discharge: HOME | End: 2020-01-23
Attending: SURGERY | Admitting: SURGERY
Payer: COMMERCIAL

## 2020-01-23 VITALS
HEIGHT: 64 IN | BODY MASS INDEX: 35.41 KG/M2 | OXYGEN SATURATION: 97 % | DIASTOLIC BLOOD PRESSURE: 67 MMHG | RESPIRATION RATE: 16 BRPM | HEART RATE: 71 BPM | TEMPERATURE: 98.6 F | SYSTOLIC BLOOD PRESSURE: 149 MMHG | WEIGHT: 207.4 LBS

## 2020-01-23 PROCEDURE — 14040 TIS TRNFR F/C/C/M/N/A/G/H/F: CPT | Performed by: PHYSICIAN ASSISTANT

## 2020-01-23 PROCEDURE — 14040 TIS TRNFR F/C/C/M/N/A/G/H/F: CPT | Performed by: SURGERY

## 2020-01-23 RX ORDER — HYDROMORPHONE HCL/PF 1 MG/ML
0.5 SYRINGE (ML) INJECTION
Status: DISCONTINUED | OUTPATIENT
Start: 2020-01-23 | End: 2020-01-23 | Stop reason: HOSPADM

## 2020-01-23 RX ORDER — ONDANSETRON 2 MG/ML
INJECTION INTRAMUSCULAR; INTRAVENOUS AS NEEDED
Status: DISCONTINUED | OUTPATIENT
Start: 2020-01-23 | End: 2020-01-23 | Stop reason: SURG

## 2020-01-23 RX ORDER — MAGNESIUM HYDROXIDE 1200 MG/15ML
LIQUID ORAL AS NEEDED
Status: DISCONTINUED | OUTPATIENT
Start: 2020-01-23 | End: 2020-01-23 | Stop reason: HOSPADM

## 2020-01-23 RX ORDER — KETOROLAC TROMETHAMINE 30 MG/ML
INJECTION, SOLUTION INTRAMUSCULAR; INTRAVENOUS AS NEEDED
Status: DISCONTINUED | OUTPATIENT
Start: 2020-01-23 | End: 2020-01-23 | Stop reason: SURG

## 2020-01-23 RX ORDER — MIDAZOLAM HYDROCHLORIDE 2 MG/2ML
INJECTION, SOLUTION INTRAMUSCULAR; INTRAVENOUS AS NEEDED
Status: DISCONTINUED | OUTPATIENT
Start: 2020-01-23 | End: 2020-01-23 | Stop reason: SURG

## 2020-01-23 RX ORDER — SODIUM CHLORIDE, SODIUM LACTATE, POTASSIUM CHLORIDE, CALCIUM CHLORIDE 600; 310; 30; 20 MG/100ML; MG/100ML; MG/100ML; MG/100ML
125 INJECTION, SOLUTION INTRAVENOUS CONTINUOUS
Status: DISCONTINUED | OUTPATIENT
Start: 2020-01-23 | End: 2020-01-23 | Stop reason: HOSPADM

## 2020-01-23 RX ORDER — CLINDAMYCIN PHOSPHATE 900 MG/50ML
INJECTION INTRAVENOUS AS NEEDED
Status: DISCONTINUED | OUTPATIENT
Start: 2020-01-23 | End: 2020-01-23 | Stop reason: SURG

## 2020-01-23 RX ORDER — ACETAMINOPHEN 325 MG/1
650 TABLET ORAL EVERY 6 HOURS
Status: DISCONTINUED | OUTPATIENT
Start: 2020-01-23 | End: 2020-01-23 | Stop reason: HOSPADM

## 2020-01-23 RX ORDER — CLINDAMYCIN PHOSPHATE 900 MG/50ML
900 INJECTION INTRAVENOUS ONCE
Status: DISCONTINUED | OUTPATIENT
Start: 2020-01-23 | End: 2020-01-23 | Stop reason: HOSPADM

## 2020-01-23 RX ORDER — FENTANYL CITRATE 50 UG/ML
INJECTION, SOLUTION INTRAMUSCULAR; INTRAVENOUS AS NEEDED
Status: DISCONTINUED | OUTPATIENT
Start: 2020-01-23 | End: 2020-01-23 | Stop reason: SURG

## 2020-01-23 RX ORDER — PROPOFOL 10 MG/ML
INJECTION, EMULSION INTRAVENOUS AS NEEDED
Status: DISCONTINUED | OUTPATIENT
Start: 2020-01-23 | End: 2020-01-23 | Stop reason: SURG

## 2020-01-23 RX ORDER — FENTANYL CITRATE/PF 50 MCG/ML
25 SYRINGE (ML) INJECTION
Status: DISCONTINUED | OUTPATIENT
Start: 2020-01-23 | End: 2020-01-23 | Stop reason: HOSPADM

## 2020-01-23 RX ADMIN — SODIUM CHLORIDE, SODIUM LACTATE, POTASSIUM CHLORIDE, AND CALCIUM CHLORIDE: .6; .31; .03; .02 INJECTION, SOLUTION INTRAVENOUS at 15:52

## 2020-01-23 RX ADMIN — FENTANYL CITRATE 50 MCG: 50 INJECTION, SOLUTION INTRAMUSCULAR; INTRAVENOUS at 16:21

## 2020-01-23 RX ADMIN — PROPOFOL 50 MG: 10 INJECTION, EMULSION INTRAVENOUS at 16:16

## 2020-01-23 RX ADMIN — PROPOFOL 50 MG: 10 INJECTION, EMULSION INTRAVENOUS at 16:43

## 2020-01-23 RX ADMIN — PROPOFOL 50 MG: 10 INJECTION, EMULSION INTRAVENOUS at 16:29

## 2020-01-23 RX ADMIN — MIDAZOLAM 2 MG: 1 INJECTION INTRAMUSCULAR; INTRAVENOUS at 16:07

## 2020-01-23 RX ADMIN — PROPOFOL 150 MG: 10 INJECTION, EMULSION INTRAVENOUS at 16:11

## 2020-01-23 RX ADMIN — KETOROLAC TROMETHAMINE 15 MG: 30 INJECTION, SOLUTION INTRAMUSCULAR; INTRAVENOUS at 16:39

## 2020-01-23 RX ADMIN — ACETAMINOPHEN 650 MG: 325 TABLET, FILM COATED ORAL at 17:42

## 2020-01-23 RX ADMIN — ONDANSETRON 4 MG: 2 INJECTION INTRAMUSCULAR; INTRAVENOUS at 16:37

## 2020-01-23 RX ADMIN — CLINDAMYCIN IN 5 PERCENT DEXTROSE 900 MG: 18 INJECTION, SOLUTION INTRAVENOUS at 16:05

## 2020-01-23 RX ADMIN — FENTANYL CITRATE 50 MCG: 50 INJECTION, SOLUTION INTRAMUSCULAR; INTRAVENOUS at 16:09

## 2020-01-23 NOTE — DISCHARGE INSTRUCTIONS
[facility]  Dr Quinten Andrews   [address] [facility], [address]  Phone: [phone]     Postoperative Instructions for Outpatient Surgery     These instructions are being provided by your doctor to give you basic guidelines during your post-op recovery  Please let our office know if your contact information has changed       Please call the office today for an appointment in 7 days for suture removal       Dressings: Steri strips, replace if they fall off       Activity Restrictions: Nothing strenuous for at least 48 hours       Bathing:  May shower tomorrow evening  Pat incision dry afterwards       Medications:    Resume pre-op medications  You may take tylenol, aleve, or ibuprofen for pain control                 Other: Elevate head of bed at night to sleep over the next 48 hours  Ice to area at 15 minute intervals over the next 48 hours while awake

## 2020-01-23 NOTE — ANESTHESIA POSTPROCEDURE EVALUATION
Post-Op Assessment Note    CV Status:  Stable  Pain Score: 0    Pain management: adequate     Mental Status:  Awake   Hydration Status:  Stable   PONV Controlled:  None   Airway Patency:  Patent   Post Op Vitals Reviewed: Yes      Staff: Anesthesiologist           /71 (01/23/20 1657)    Temp 98 7 °F (37 1 °C) (01/23/20 1657)    Pulse 93 (01/23/20 1657)   Resp 16 (01/23/20 1657)    SpO2 98 % (01/23/20 1657)

## 2020-01-23 NOTE — ANESTHESIA POSTPROCEDURE EVALUATION
Post-Op Assessment Note    CV Status:  Stable  Pain Score: 0    Pain management: adequate     Mental Status:  Alert and awake   Hydration Status:  Euvolemic   PONV Controlled:  Controlled   Airway Patency:  Patent   Post Op Vitals Reviewed: Yes      Staff: CRNA           /71 (01/23/20 1657)    Temp 98 7 °F (37 1 °C) (01/23/20 1657)    Pulse 93 (01/23/20 1657)   Resp 16 (01/23/20 1657)    SpO2 98 % (01/23/20 1657)

## 2020-01-23 NOTE — INTERVAL H&P NOTE
H&P reviewed  After examining the patient I find no changes in the patients condition since the H&P had been written      Vitals:    01/23/20 1152   BP: 154/69   Pulse: 77   Resp: 18   Temp: 97 7 °F (36 5 °C)   SpO2: 98%

## 2020-01-23 NOTE — ANESTHESIA PREPROCEDURE EVALUATION
Review of Systems/Medical History  Patient summary reviewed  Chart reviewed      Cardiovascular  EKG reviewed,    Pulmonary       GI/Hepatic            Endo/Other     GYN       Hematology   Musculoskeletal       Neurology   Psychology           Physical Exam    Airway    Mallampati score: II  TM Distance: >3 FB  Neck ROM: full     Dental   No notable dental hx     Cardiovascular  Rhythm: regular, Rate: normal, Cardiovascular exam normal    Pulmonary  Pulmonary exam normal Breath sounds clear to auscultation,     Other Findings        Anesthesia Plan  ASA Score- 2     Anesthesia Type- general with ASA Monitors  Additional Monitors:   Airway Plan: LMA  Comment: Benefits and risks of planned anesthetic discussed with patient, and agrees to proceed  I, Dr Jeison Licona, the attending anesthesiologist, have personally seen and evaluated the patient prior to anesthetic care  I have reviewed the preanesthetic record, and other medical records if appropriate to the anesthetic care  If a CRNA is involved in the case, I have reviewed the CRNA assessment, if present, and agree  The patient is in a suitable condition to proceed with my formulated anesthetic plan        Plan Factors-    Induction- intravenous  Postoperative Plan- Plan for postoperative opioid use  Informed Consent- Anesthetic plan and risks discussed with patient

## 2020-01-27 NOTE — OP NOTE
OPERATIVE REPORT  PATIENT NAME: Sam Gonzales    :  1955  MRN: [id number]  Pt Location: [facility] OR ROOM 01    SURGERY DATE: 2020    Surgeon(s) and Role:     * Lakisha Antonio MD - Primary     * Rajesh Foster PA-C - Assisting    Preop Diagnosis:  Basal cell carcinoma of chin [C44 319]    Post-Op Diagnosis Codes: * Basal cell carcinoma of chin [C44 319]    Procedure:  1  Preparation of Mohs defect of chin in anticipation for reconstruction, excision of wound margins to prepare for reconstruction ()   2  Reconstruction Mohs defect of chin with adjacent tissue transfer/local flap 4 cm x 2 cm   Specimen(s):  * No specimens in log *    Estimated Blood Loss:   50 mL    Drains:  * No LDAs found *    Anesthesia Type:   General/LMA    Operative Indications:  Basal cell carcinoma of chin [C44 319]  Status post Mohs    Operative Findings:  As above    Complications:   None    Procedure and Technique:  Malia Aguilar was seen preoperatively in the holding area and the surgical site was marked with her participation  We reviewed the planned procedure as well as potential risks, complications limitations she was taken to the operating room and underwent induction of anesthesia by the anesthesia personnel  The operative field was prepped and draped in sterile fashion a proper time-out was performed  2 5 loupe magnification was used aid visualization  The defect was infiltrated with xylocaine epinephrine, the bed sharply excised utilizing a 15 blade in order to remove the cautery artifact and to prepare the wound for reconstruction     Following this , flaps were developed both medial and lateral to the defect, the flap margins were marked out surgical marking pen and the margins incised was with a 15 blade after injecting the areas with xylocaine with epinephrine  The flaps were then elevated superficial to the underlying musculature utilizing a spreading technique with tenotomy scissors    Hemostasis was obtained throughout utilizing the Bovie cautery  Once adequate flap elevation had been completed the wound was irrigated hemostasis assured  Flaps were then advanced to fill the defect and closure was undertaken with 4-0 Monocryl sutures buried at the level of the deep dermis this was followed by running subcuticular 5 O Monocryl  The flap margins appeared to be fully viable and the wound was protected with bacitracin Xeroform followed by application of a dry sterile dressing  The patient was transferred to the recovery room  I was present for the entire procedure  Qualified resident was not available for the procedure and the physician assistant provided essential assistance with retraction, exposure, hemostasis, and wound closure      Patient Disposition:  PACU     SIGNATURE: Michael Bustillos MD  DATE: January 27, 2020  TIME: 9:53 AM

## 2020-01-31 ENCOUNTER — OFFICE VISIT (OUTPATIENT)
Dept: PLASTIC SURGERY | Facility: CLINIC | Age: 65
End: 2020-01-31

## 2020-01-31 DIAGNOSIS — C44.319 BASAL CELL CARCINOMA OF CHIN: Primary | ICD-10-CM

## 2020-01-31 PROCEDURE — 99024 POSTOP FOLLOW-UP VISIT: CPT | Performed by: PHYSICIAN ASSISTANT

## 2020-01-31 NOTE — PROGRESS NOTES
Assessment/Plan:   Roselyn Pulliam is a pleasant 80-year-old female who is 1 week status post reconstruction of a Mohs defect of the left chin with local tissue transfer flaps done in conjunction with Dr Frank White  Please see HPI  She was given instructions on how to use silicone scar gel and to avoid sun exposure  We will see her back in 4-6 weeks or sooner with any concerns  Diagnoses and all orders for this visit:    Basal cell carcinoma of chin          Subjective:     Patient ID: Celena Crain is a 62 y o  female  HPI   She is feeling well and denies any complaints regarding her incision area  Review of Systems   Skin:        As per HPI  Objective:     Physical Exam   Skin:   Left chin incision is clean, dry and intact  Suture knots were removed

## 2020-01-31 NOTE — LETTER
January 31, 2020     [facility] [address]    Patient: Jennifer Dorman   YOB: 1955   Date of Visit: 1/31/2020       Dear Dr Theresa Alejandra:    Thank you for referring Mickey Amor to me for evaluation  Below are my notes for this consultation  If you have questions, please do not hesitate to call me  I look forward to following your patient along with you  Sincerely,        Shelbi Wilson PA-C        CC: MD Shelbi Petty PA-C  1/31/2020 12:23 PM  Sign at close encounter  Assessment/Plan:   Gerson Wheeler is a pleasant 18-year-old female who is 1 week status post reconstruction of a Mohs defect of the left chin with local tissue transfer flaps done in conjunction with Dr Flaco Olson  Please see HPI  She was given instructions on how to use silicone scar gel and to avoid sun exposure  We will see her back in 4-6 weeks or sooner with any concerns  Diagnoses and all orders for this visit:    Basal cell carcinoma of chin          Subjective:     Patient ID: Jennifer Dorman is a 62 y o  female  HPI   She is feeling well and denies any complaints regarding her incision area  Review of Systems   Skin:        As per HPI  Objective:     Physical Exam   Skin:   Left chin incision is clean, dry and intact  Suture knots were removed

## 2020-02-03 ENCOUNTER — TELEPHONE (OUTPATIENT)
Dept: PLASTIC SURGERY | Facility: CLINIC | Age: 65
End: 2020-02-03

## 2020-02-03 NOTE — TELEPHONE ENCOUNTER
Patient called questioning if jaqueline can have a dental procedure done  On feb 19 th after surgery 1/23/2020  Spoke with Dr Von Yoder to confirm that this is ok  Called patient and notified that Dr Von Yoder said this is ok to have done

## 2020-02-08 ENCOUNTER — APPOINTMENT (OUTPATIENT)
Dept: LAB | Facility: MEDICAL CENTER | Age: 65
End: 2020-02-08
Payer: COMMERCIAL

## 2020-02-08 ENCOUNTER — TRANSCRIBE ORDERS (OUTPATIENT)
Dept: ADMINISTRATIVE | Facility: HOSPITAL | Age: 65
End: 2020-02-08

## 2020-02-08 DIAGNOSIS — E78.5 HYPERLIPIDEMIA, UNSPECIFIED HYPERLIPIDEMIA TYPE: ICD-10-CM

## 2020-02-08 DIAGNOSIS — R53.83 OTHER FATIGUE: ICD-10-CM

## 2020-02-08 DIAGNOSIS — E78.5 HYPERLIPIDEMIA, UNSPECIFIED HYPERLIPIDEMIA TYPE: Primary | ICD-10-CM

## 2020-02-08 LAB
25(OH)D3 SERPL-MCNC: 62.3 NG/ML (ref 30–100)
ALBUMIN SERPL BCP-MCNC: 4.1 G/DL (ref 3.5–5)
ALP SERPL-CCNC: 130 U/L (ref 46–116)
ALT SERPL W P-5'-P-CCNC: 74 U/L (ref 12–78)
ANION GAP SERPL CALCULATED.3IONS-SCNC: 5 MMOL/L (ref 4–13)
AST SERPL W P-5'-P-CCNC: 47 U/L (ref 5–45)
BILIRUB SERPL-MCNC: 0.36 MG/DL (ref 0.2–1)
BUN SERPL-MCNC: 18 MG/DL (ref 5–25)
CALCIUM SERPL-MCNC: 8.8 MG/DL (ref 8.3–10.1)
CHLORIDE SERPL-SCNC: 109 MMOL/L (ref 100–108)
CHOLEST SERPL-MCNC: 249 MG/DL (ref 50–200)
CO2 SERPL-SCNC: 28 MMOL/L (ref 21–32)
CREAT SERPL-MCNC: 0.81 MG/DL (ref 0.6–1.3)
FOLATE SERPL-MCNC: 15.2 NG/ML (ref 3.1–17.5)
GFR SERPL CREATININE-BSD FRML MDRD: 78 ML/MIN/1.73SQ M
GLUCOSE P FAST SERPL-MCNC: 76 MG/DL (ref 65–99)
HDLC SERPL-MCNC: 71 MG/DL
LDLC SERPL CALC-MCNC: 162 MG/DL (ref 0–100)
LDLC SERPL DIRECT ASSAY-MCNC: 151 MG/DL (ref 0–100)
NONHDLC SERPL-MCNC: 178 MG/DL
POTASSIUM SERPL-SCNC: 4.5 MMOL/L (ref 3.5–5.3)
PROT SERPL-MCNC: 6.8 G/DL (ref 6.4–8.2)
SODIUM SERPL-SCNC: 142 MMOL/L (ref 136–145)
T4 FREE SERPL-MCNC: 0.82 NG/DL (ref 0.76–1.46)
TRIGL SERPL-MCNC: 79 MG/DL
TSH SERPL DL<=0.05 MIU/L-ACNC: 5.29 UIU/ML (ref 0.36–3.74)
VIT B12 SERPL-MCNC: 1848 PG/ML (ref 100–900)

## 2020-02-08 PROCEDURE — 82306 VITAMIN D 25 HYDROXY: CPT | Performed by: FAMILY MEDICINE

## 2020-02-08 PROCEDURE — 86617 LYME DISEASE ANTIBODY: CPT | Performed by: FAMILY MEDICINE

## 2020-02-08 PROCEDURE — 36415 COLL VENOUS BLD VENIPUNCTURE: CPT | Performed by: FAMILY MEDICINE

## 2020-02-08 PROCEDURE — 86618 LYME DISEASE ANTIBODY: CPT | Performed by: FAMILY MEDICINE

## 2020-02-08 PROCEDURE — 80061 LIPID PANEL: CPT | Performed by: FAMILY MEDICINE

## 2020-02-08 PROCEDURE — 83721 ASSAY OF BLOOD LIPOPROTEIN: CPT | Performed by: FAMILY MEDICINE

## 2020-02-08 PROCEDURE — 80053 COMPREHEN METABOLIC PANEL: CPT | Performed by: FAMILY MEDICINE

## 2020-02-08 PROCEDURE — 84439 ASSAY OF FREE THYROXINE: CPT | Performed by: FAMILY MEDICINE

## 2020-02-08 PROCEDURE — 84443 ASSAY THYROID STIM HORMONE: CPT | Performed by: FAMILY MEDICINE

## 2020-02-08 PROCEDURE — 82746 ASSAY OF FOLIC ACID SERUM: CPT

## 2020-02-08 PROCEDURE — 82607 VITAMIN B-12: CPT | Performed by: FAMILY MEDICINE

## 2020-02-11 LAB
B BURGDOR IGG PATRN SER IB-IMP: NEGATIVE
B BURGDOR IGG+IGM SER-ACNC: 1.34 ISR (ref 0–0.9)
B BURGDOR IGM PATRN SER IB-IMP: NEGATIVE
B BURGDOR18KD IGG SER QL IB: PRESENT
B BURGDOR23KD IGG SER QL IB: ABNORMAL
B BURGDOR23KD IGM SER QL IB: ABNORMAL
B BURGDOR28KD IGG SER QL IB: ABNORMAL
B BURGDOR30KD IGG SER QL IB: ABNORMAL
B BURGDOR39KD IGG SER QL IB: PRESENT
B BURGDOR39KD IGM SER QL IB: ABNORMAL
B BURGDOR41KD IGG SER QL IB: PRESENT
B BURGDOR41KD IGM SER QL IB: ABNORMAL
B BURGDOR45KD IGG SER QL IB: ABNORMAL
B BURGDOR58KD IGG SER QL IB: ABNORMAL
B BURGDOR66KD IGG SER QL IB: ABNORMAL
B BURGDOR93KD IGG SER QL IB: ABNORMAL

## 2020-03-02 ENCOUNTER — OFFICE VISIT (OUTPATIENT)
Dept: PLASTIC SURGERY | Facility: CLINIC | Age: 65
End: 2020-03-02

## 2020-03-02 DIAGNOSIS — C44.319 BASAL CELL CARCINOMA OF CHIN: Primary | ICD-10-CM

## 2020-03-02 PROCEDURE — 99024 POSTOP FOLLOW-UP VISIT: CPT | Performed by: PHYSICIAN ASSISTANT

## 2020-03-02 NOTE — PROGRESS NOTES
Assessment/Plan:   Lakshmi Block is a pleasant 27-year-old female who is 5 weeks status post reconstruction of a Mohs defect of the left chin with local tissue transfer flaps done in conjunction with Dr Latrice Groves  Please see HPI  She can become more aggressive with massage and silicone scar gel  Scars can take up to 1 year to fully mature  She will also avoid excessive sun exposure  Follow up in 3 months or sooner with any concerns  Diagnoses and all orders for this visit:    Basal cell carcinoma of chin          Subjective:     Patient ID: Isaias Ochoa is a 62 y o  female  HPI   She reports that the scar is bumpy  Review of Systems   Skin:        As per HPI  Objective:     Physical Exam   Skin:   Left chin scar is healing well with mild pink pigment  Please see photos

## 2020-03-02 NOTE — LETTER
March 2, 2020     [facility] [address]    Patient: Sam Gonzales   YOB: 1955   Date of Visit: 3/2/2020       Dear Dr Ngoc Bird:    Thank you for referring Chathamcarla Jay to me for evaluation  Below are my notes for this consultation  If you have questions, please do not hesitate to call me  I look forward to following your patient along with you  Sincerely,        Rajesh Foster PA-C        CC: MD Rajesh Putnam PA-C  3/2/2020  2:51 PM  Sign at close encounter  Assessment/Plan:   Malia Aguilar is a pleasant 61-year-old female who is 5 weeks status post reconstruction of a Mohs defect of the left chin with local tissue transfer flaps done in conjunction with Dr Patrick Diez  Please see HPI  She can become more aggressive with massage and silicone scar gel  She will also avoid excessive sun exposure  Follow up in 3 months or sooner with any concerns  Diagnoses and all orders for this visit:    Basal cell carcinoma of chin          Subjective:     Patient ID: Sam Gonzales is a 62 y o  female  HPI   She reports that the scar is bumpy  Review of Systems   Skin:        As per HPI  Objective:     Physical Exam   Skin:   Left chin scar is healing well with mild pink pigment  Please see photos

## 2020-06-15 ENCOUNTER — APPOINTMENT (OUTPATIENT)
Dept: RADIOLOGY | Facility: MEDICAL CENTER | Age: 65
End: 2020-06-15
Payer: COMMERCIAL

## 2020-06-15 ENCOUNTER — APPOINTMENT (OUTPATIENT)
Dept: LAB | Facility: MEDICAL CENTER | Age: 65
End: 2020-06-15
Payer: COMMERCIAL

## 2020-06-15 ENCOUNTER — TRANSCRIBE ORDERS (OUTPATIENT)
Dept: ADMINISTRATIVE | Facility: HOSPITAL | Age: 65
End: 2020-06-15

## 2020-06-15 ENCOUNTER — OFFICE VISIT (OUTPATIENT)
Dept: PLASTIC SURGERY | Facility: CLINIC | Age: 65
End: 2020-06-15
Payer: COMMERCIAL

## 2020-06-15 ENCOUNTER — OFFICE VISIT (OUTPATIENT)
Dept: URGENT CARE | Facility: MEDICAL CENTER | Age: 65
End: 2020-06-15
Payer: COMMERCIAL

## 2020-06-15 VITALS — HEIGHT: 64 IN | RESPIRATION RATE: 16 BRPM | WEIGHT: 204.6 LBS | BODY MASS INDEX: 34.93 KG/M2 | TEMPERATURE: 97.9 F

## 2020-06-15 VITALS
RESPIRATION RATE: 18 BRPM | BODY MASS INDEX: 35.17 KG/M2 | OXYGEN SATURATION: 97 % | SYSTOLIC BLOOD PRESSURE: 148 MMHG | WEIGHT: 206 LBS | HEART RATE: 78 BPM | TEMPERATURE: 97.6 F | DIASTOLIC BLOOD PRESSURE: 70 MMHG | HEIGHT: 64 IN

## 2020-06-15 DIAGNOSIS — Z98.890 STATUS POST MOHS SURGERY: Primary | ICD-10-CM

## 2020-06-15 DIAGNOSIS — E03.9 HYPOTHYROIDISM, UNSPECIFIED TYPE: ICD-10-CM

## 2020-06-15 DIAGNOSIS — S93.401A SPRAIN OF RIGHT ANKLE, UNSPECIFIED LIGAMENT, INITIAL ENCOUNTER: ICD-10-CM

## 2020-06-15 DIAGNOSIS — E78.5 HYPERLIPIDEMIA, UNSPECIFIED HYPERLIPIDEMIA TYPE: Primary | ICD-10-CM

## 2020-06-15 DIAGNOSIS — S93.401A SPRAIN OF RIGHT ANKLE, UNSPECIFIED LIGAMENT, INITIAL ENCOUNTER: Primary | ICD-10-CM

## 2020-06-15 LAB
ALBUMIN SERPL BCP-MCNC: 4.1 G/DL (ref 3.5–5)
ALP SERPL-CCNC: 91 U/L (ref 46–116)
ALT SERPL W P-5'-P-CCNC: 38 U/L (ref 12–78)
ANION GAP SERPL CALCULATED.3IONS-SCNC: 5 MMOL/L (ref 4–13)
AST SERPL W P-5'-P-CCNC: 25 U/L (ref 5–45)
BILIRUB SERPL-MCNC: 0.32 MG/DL (ref 0.2–1)
BUN SERPL-MCNC: 16 MG/DL (ref 5–25)
CALCIUM SERPL-MCNC: 8.7 MG/DL (ref 8.3–10.1)
CHLORIDE SERPL-SCNC: 108 MMOL/L (ref 100–108)
CHOLEST SERPL-MCNC: 228 MG/DL (ref 50–200)
CO2 SERPL-SCNC: 25 MMOL/L (ref 21–32)
CREAT SERPL-MCNC: 0.74 MG/DL (ref 0.6–1.3)
GFR SERPL CREATININE-BSD FRML MDRD: 86 ML/MIN/1.73SQ M
GLUCOSE P FAST SERPL-MCNC: 84 MG/DL (ref 65–99)
HDLC SERPL-MCNC: 75 MG/DL
LDLC SERPL CALC-MCNC: 143 MG/DL (ref 0–100)
LDLC SERPL DIRECT ASSAY-MCNC: 133 MG/DL (ref 0–100)
NONHDLC SERPL-MCNC: 153 MG/DL
POTASSIUM SERPL-SCNC: 4.6 MMOL/L (ref 3.5–5.3)
PROT SERPL-MCNC: 7 G/DL (ref 6.4–8.2)
SODIUM SERPL-SCNC: 138 MMOL/L (ref 136–145)
T4 FREE SERPL-MCNC: 1.01 NG/DL (ref 0.76–1.46)
TRIGL SERPL-MCNC: 49 MG/DL
TSH SERPL DL<=0.05 MIU/L-ACNC: 2.07 UIU/ML (ref 0.36–3.74)

## 2020-06-15 PROCEDURE — 80061 LIPID PANEL: CPT | Performed by: FAMILY MEDICINE

## 2020-06-15 PROCEDURE — 84439 ASSAY OF FREE THYROXINE: CPT | Performed by: FAMILY MEDICINE

## 2020-06-15 PROCEDURE — 36415 COLL VENOUS BLD VENIPUNCTURE: CPT | Performed by: FAMILY MEDICINE

## 2020-06-15 PROCEDURE — 84443 ASSAY THYROID STIM HORMONE: CPT | Performed by: FAMILY MEDICINE

## 2020-06-15 PROCEDURE — 83721 ASSAY OF BLOOD LIPOPROTEIN: CPT | Performed by: FAMILY MEDICINE

## 2020-06-15 PROCEDURE — 73630 X-RAY EXAM OF FOOT: CPT

## 2020-06-15 PROCEDURE — 99214 OFFICE O/P EST MOD 30 MIN: CPT | Performed by: SURGERY

## 2020-06-15 PROCEDURE — 80053 COMPREHEN METABOLIC PANEL: CPT | Performed by: FAMILY MEDICINE

## 2020-06-15 PROCEDURE — 73610 X-RAY EXAM OF ANKLE: CPT

## 2020-06-15 PROCEDURE — 99213 OFFICE O/P EST LOW 20 MIN: CPT | Performed by: PHYSICIAN ASSISTANT

## 2020-06-15 RX ORDER — LEVOTHYROXINE SODIUM 0.05 MG/1
50 TABLET ORAL DAILY
COMMUNITY
Start: 2020-06-11

## 2020-06-26 ENCOUNTER — ANNUAL EXAM (OUTPATIENT)
Dept: OBGYN CLINIC | Facility: MEDICAL CENTER | Age: 65
End: 2020-06-26
Payer: COMMERCIAL

## 2020-06-26 VITALS
WEIGHT: 207 LBS | SYSTOLIC BLOOD PRESSURE: 148 MMHG | BODY MASS INDEX: 35.34 KG/M2 | DIASTOLIC BLOOD PRESSURE: 78 MMHG | HEIGHT: 64 IN

## 2020-06-26 DIAGNOSIS — N85.01 COMPLEX ENDOMETRIAL HYPERPLASIA: ICD-10-CM

## 2020-06-26 DIAGNOSIS — Z01.419 ENCOUNTER FOR GYNECOLOGICAL EXAMINATION (GENERAL) (ROUTINE) WITHOUT ABNORMAL FINDINGS: Primary | ICD-10-CM

## 2020-06-26 DIAGNOSIS — Z12.31 ENCOUNTER FOR SCREENING MAMMOGRAM FOR MALIGNANT NEOPLASM OF BREAST: ICD-10-CM

## 2020-06-26 DIAGNOSIS — Z11.51 SCREENING FOR HUMAN PAPILLOMAVIRUS (HPV): ICD-10-CM

## 2020-06-26 DIAGNOSIS — Z12.4 CERVICAL CANCER SCREENING: ICD-10-CM

## 2020-06-26 PROCEDURE — S0612 ANNUAL GYNECOLOGICAL EXAMINA: HCPCS | Performed by: OBSTETRICS & GYNECOLOGY

## 2020-06-30 LAB
CYTOLOGIST CVX/VAG CYTO: NORMAL
DX ICD CODE: NORMAL
HPV I/H RISK 1 DNA CVX QL PROBE+SIG AMP: NEGATIVE
OTHER STN SPEC: NORMAL
PATH REPORT.FINAL DX SPEC: NORMAL
SL AMB NOTE:: NORMAL
SL AMB SPECIMEN ADEQUACY: NORMAL
SL AMB TEST METHODOLOGY: NORMAL

## 2020-12-01 ENCOUNTER — TRANSCRIBE ORDERS (OUTPATIENT)
Dept: ADMINISTRATIVE | Facility: HOSPITAL | Age: 65
End: 2020-12-01

## 2020-12-01 ENCOUNTER — LAB (OUTPATIENT)
Dept: LAB | Facility: MEDICAL CENTER | Age: 65
End: 2020-12-01
Payer: COMMERCIAL

## 2020-12-01 DIAGNOSIS — E03.9 ACQUIRED HYPOTHYROIDISM: Primary | ICD-10-CM

## 2020-12-01 LAB — TSH SERPL DL<=0.05 MIU/L-ACNC: 3.3 UIU/ML (ref 0.36–3.74)

## 2020-12-01 PROCEDURE — 84443 ASSAY THYROID STIM HORMONE: CPT | Performed by: INTERNAL MEDICINE

## 2020-12-01 PROCEDURE — 36415 COLL VENOUS BLD VENIPUNCTURE: CPT | Performed by: INTERNAL MEDICINE

## 2021-03-30 DIAGNOSIS — Z23 ENCOUNTER FOR IMMUNIZATION: ICD-10-CM

## 2021-07-09 ENCOUNTER — APPOINTMENT (OUTPATIENT)
Dept: LAB | Facility: MEDICAL CENTER | Age: 66
End: 2021-07-09
Payer: COMMERCIAL

## 2021-07-09 ENCOUNTER — ANNUAL EXAM (OUTPATIENT)
Dept: OBGYN CLINIC | Facility: MEDICAL CENTER | Age: 66
End: 2021-07-09
Payer: COMMERCIAL

## 2021-07-09 VITALS
BODY MASS INDEX: 31.92 KG/M2 | DIASTOLIC BLOOD PRESSURE: 80 MMHG | SYSTOLIC BLOOD PRESSURE: 125 MMHG | WEIGHT: 187 LBS | HEIGHT: 64 IN

## 2021-07-09 DIAGNOSIS — Z01.419 ENCOUNTER FOR GYNECOLOGICAL EXAMINATION (GENERAL) (ROUTINE) WITHOUT ABNORMAL FINDINGS: Primary | ICD-10-CM

## 2021-07-09 DIAGNOSIS — Z12.31 ENCOUNTER FOR SCREENING MAMMOGRAM FOR MALIGNANT NEOPLASM OF BREAST: ICD-10-CM

## 2021-07-09 PROCEDURE — 99396 PREV VISIT EST AGE 40-64: CPT | Performed by: OBSTETRICS & GYNECOLOGY

## 2021-07-09 RX ORDER — VALACYCLOVIR HYDROCHLORIDE 1 G/1
TABLET, FILM COATED ORAL
COMMUNITY
Start: 2021-07-05

## 2021-07-09 NOTE — PROGRESS NOTES
Darlene Duy   1955    CC:  Yearly exam    S:  60 y o  female here for yearly exam  She is postmenopausal and has had no vaginal bleeding  She denies vaginal discharge, itching, odor or dryness  GYN history significant for ablation, unilateral oophorectomy, LEEP - uncertain dates  Also h/o complex hyperplasia w/o atypia in 2017 - given progestins  EMB was benign  Sexual activity: She is sexually active without pain, bleeding or dryness  Last Pap: 6/26/20 - Normal Cytology, Negative HPV  Last Mammo: 2/10/21 - [facility] 2  Last Colonoscopy: 2018 -5 yr recall     We reviewed ASC guidelines for Pap testing  Family hx of breast cancer:  Mother (80)  Family hx of ovarian cancer: no  Family hx of colon cancer: no      Current Outpatient Medications:     Ascorbic Acid (VITAMIN C) 500 MG CAPS, Take by mouth daily , Disp: , Rfl:     ASHWAGANDHA PO, Take by mouth daily, Disp: , Rfl:     B Complex Vitamins (B COMPLEX 50 PO), Take by mouth daily , Disp: , Rfl:     Boswellia Celina (BOSWELLIA PO), Take by mouth daily, Disp: , Rfl:     Cholecalciferol (VITAMIN D3) 1000 units CAPS, Take by mouth daily , Disp: , Rfl:     Coenzyme Q10 (CO Q-10 PO), Take by mouth daily, Disp: , Rfl:     diphenhydrAMINE (BENADRYL) 25 mg capsule, Take 25 mg by mouth daily at bedtime as needed , Disp: , Rfl:     FENUGREEK PO, Take by mouth daily, Disp: , Rfl:     levothyroxine 50 mcg tablet, Take 50 mcg by mouth daily, Disp: , Rfl:     Multiple Vitamins-Minerals (ZINC PO), Take by mouth, Disp: , Rfl:     NIACIN PO, Take by mouth, Disp: , Rfl:     Probiotic Product (PROBIOTIC DAILY PO), Take by mouth, Disp: , Rfl:     Turmeric 500 MG TABS, Take by mouth daily , Disp: , Rfl:     valACYclovir (VALTREX) 1,000 mg tablet, TAKE ONE TABLET BY MOUTH THREE TIMES A DAY X 7 DAYS, Disp: , Rfl:     Family History   Problem Relation Age of Onset    Anemia Mother     Hypertension Mother     Hypertension Father     Prostate cancer Father     Anemia Sister     Thyroid cancer Sister     Coronary artery disease Maternal Grandmother     Lung cancer Maternal Grandfather      Past Medical History:   Diagnosis Date    Candidal vulvovaginitis     Cervical polyp     Chronic sinusitis     Herpes simplex     Herpes simplex infection     Mild dysplasia of cervix (YOLANDE I)     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    Normal delivery     1985 daughter    Postmenopausal bleeding 2/16/2017    Rotator cuff tear, right     SVT (supraventricular tachycardia) ([address] )     Thickened endometrium 1/20/2017    UTI (urinary tract infection)     Vaginal discharge 1/20/2017        Review of Systems   Respiratory: Negative  Cardiovascular: Negative  Gastrointestinal: Negative for constipation and diarrhea  Genitourinary: Negative for difficulty urinating, pelvic pain, vaginal bleeding, vaginal discharge, itching or odor  O:  Blood pressure 125/80, height 5' 4" (1 626 m), weight 84 8 kg (187 lb)  Patient appears well and is not in distress  Breasts are symmetrical without mass, tenderness, nipple discharge, skin changes or adenopathy  Abdomen is soft and nontender without masses  External genitals are normal without lesions or rashes  Urethral meatus and urethra are normal  Bladder is normal to palpation  Vagina is normal without discharge or bleeding  Cervix is normal without discharge or lesion  Uterus is normal, mobile, nontender without palpable mass  Adnexa are normal, nontender, without palpable mass  A:  Yearly exam      P:   Pap up to date  Mammo slip given   Colonoscopy up to date   To call with any bleeding for evaluation    RTO one year for yearly exam or sooner as needed

## 2022-02-28 ENCOUNTER — TELEPHONE (OUTPATIENT)
Dept: OBGYN CLINIC | Facility: MEDICAL CENTER | Age: 67
End: 2022-02-28

## 2022-07-22 ENCOUNTER — ANNUAL EXAM (OUTPATIENT)
Dept: OBGYN CLINIC | Facility: MEDICAL CENTER | Age: 67
End: 2022-07-22
Payer: COMMERCIAL

## 2022-07-22 VITALS
BODY MASS INDEX: 34.66 KG/M2 | WEIGHT: 195.6 LBS | HEIGHT: 63 IN | DIASTOLIC BLOOD PRESSURE: 84 MMHG | SYSTOLIC BLOOD PRESSURE: 140 MMHG

## 2022-07-22 DIAGNOSIS — Z12.39 BREAST CANCER SCREENING, HIGH RISK PATIENT: ICD-10-CM

## 2022-07-22 DIAGNOSIS — Z13.820 SCREENING FOR OSTEOPOROSIS: ICD-10-CM

## 2022-07-22 DIAGNOSIS — Z15.01 BREAST CANCER GENETIC SUSCEPTIBILITY: ICD-10-CM

## 2022-07-22 DIAGNOSIS — Z12.31 ENCOUNTER FOR SCREENING MAMMOGRAM FOR BREAST CANCER: Primary | ICD-10-CM

## 2022-07-22 PROCEDURE — G0101 CA SCREEN;PELVIC/BREAST EXAM: HCPCS | Performed by: OBSTETRICS & GYNECOLOGY

## 2022-07-22 NOTE — PROGRESS NOTES
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  Increased breast cancer risk  Sexual activity: She is sexually active without pain, bleeding  Last Pap: 6/26/20 - Normal Cytology, Neg HPV  Last Mammo: 8/2/21 - [facility] 1  Last Colonoscopy: 2018 -5 yr recall   Last DEXA:  None     We reviewed ASC guidelines for Pap testing       Family hx of breast cancer: mother, daughter  Family hx of ovarian cancer: no  Family hx of colon cancer: no      Current Outpatient Medications:     Ascorbic Acid (VITAMIN C) 500 MG CAPS, Take by mouth daily , Disp: , Rfl:     ASHWAGANDHA PO, Take by mouth daily, Disp: , Rfl:     B Complex Vitamins (B COMPLEX 50 PO), Take by mouth daily , Disp: , Rfl:     Boswellia Celina (BOSWELLIA PO), Take by mouth daily, Disp: , Rfl:     Cholecalciferol (VITAMIN D3) 1000 units CAPS, Take by mouth daily , Disp: , Rfl:     Coenzyme Q10 (CO Q-10 PO), Take by mouth daily, Disp: , Rfl:     diphenhydrAMINE (BENADRYL) 25 mg capsule, Take 25 mg by mouth daily at bedtime as needed , Disp: , Rfl:     FENUGREEK PO, Take by mouth daily, Disp: , Rfl:     levothyroxine 50 mcg tablet, Take 50 mcg by mouth daily, Disp: , Rfl:     MAGNESIUM PO, Take by mouth, Disp: , Rfl:     Multiple Vitamins-Minerals (ZINC PO), Take by mouth, Disp: , Rfl:     NIACIN PO, Take by mouth, Disp: , Rfl:     Probiotic Product (PROBIOTIC DAILY PO), Take by mouth, Disp: , Rfl:     Turmeric 500 MG TABS, Take by mouth daily , Disp: , Rfl:     valACYclovir (VALTREX) 1,000 mg tablet, TAKE ONE TABLET BY MOUTH THREE TIMES A DAY X 7 DAYS (Patient not taking: Reported on 7/22/2022), Disp: , Rfl:   Patient Active Problem List   Diagnosis    Abdominal pain, LLQ (left lower quadrant)    Backache    Chronic sinusitis    Complex endometrial hyperplasia    Intervertebral disc disorder with radiculopathy of lumbar region    Left knee pain    Nerve sheath tumor    Pelvic pain in female    Pes anserinus tendinitis of left lower extremity    Symptoms involving urinary system    Increased frequency of urination    Hesitancy of micturition    Encounter for gynecological examination (general) (routine) without abnormal findings    Basal cell carcinoma of chin     Family History   Problem Relation Age of Onset    Anemia Mother     Hypertension Mother     Hypertension Father     Prostate cancer Father     Anemia Sister     Thyroid cancer Sister     Coronary artery disease Maternal Grandmother     Lung cancer Maternal Grandfather      Past Medical History:   Diagnosis Date    Candidal vulvovaginitis     Cervical polyp     Chronic sinusitis     Gene mutation     Chek 2    Herpes simplex     Herpes simplex infection     Mild dysplasia of cervix (YOLANDE I)     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    Normal delivery     1985 daughter    Postmenopausal bleeding 02/16/2017    Rotator cuff tear, right     SVT ([address]raventricular tachycardia) ([facility] [address]     Thickened endometrium 01/20/2017    UTI (urinary tract infection)     Vaginal discharge 01/20/2017        Review of Systems   Respiratory: Negative  Cardiovascular: Negative  Gastrointestinal: Negative for constipation and diarrhea  Genitourinary: Negative for difficulty urinating, pelvic pain, vaginal bleeding, vaginal discharge, itching or odor  O:  Blood pressure 140/84, height 5' 3 25" (1 607 m), weight 88 7 kg (195 lb 9 6 oz)  Patient appears well and is not in distress  Breasts are symmetrical without mass, tenderness, nipple discharge, skin changes or adenopathy  Abdomen is soft and nontender without masses  External genitals are normal without lesions or rashes    Urethral meatus and urethra are normal  Bladder is normal to palpation  Vagina is normal without discharge or bleeding, generalized atrophic changes present   Cervix is normal without discharge or lesion  Uterus is normal, mobile, nontender without palpable mass  Adnexa are normal, nontender, without palpable mass  A:  Yearly exam      P:   Pap & HPV up to date  Mammo ordered, breast MRI also ordered (can check insurance prior)   Colon Cancer Screening due 2023   DEXA ordered  Reviewed considerations of menopause, to call with any postmenopausal bleeding or other concerns  RTO one year for yearly exam or sooner as needed

## 2022-08-03 DIAGNOSIS — Z12.31 ENCOUNTER FOR SCREENING MAMMOGRAM FOR BREAST CANCER: ICD-10-CM

## 2022-08-08 ENCOUNTER — HOSPITAL ENCOUNTER (EMERGENCY)
Facility: HOSPITAL | Age: 67
Discharge: HOME | End: 2022-08-08
Attending: EMERGENCY MEDICINE | Admitting: EMERGENCY MEDICINE
Payer: COMMERCIAL

## 2022-08-08 ENCOUNTER — APPOINTMENT (EMERGENCY)
Dept: RADIOLOGY | Facility: HOSPITAL | Age: 67
End: 2022-08-08
Payer: COMMERCIAL

## 2022-08-08 VITALS
OXYGEN SATURATION: 99 % | TEMPERATURE: 98 F | DIASTOLIC BLOOD PRESSURE: 70 MMHG | RESPIRATION RATE: 20 BRPM | HEART RATE: 64 BPM | SYSTOLIC BLOOD PRESSURE: 150 MMHG

## 2022-08-08 DIAGNOSIS — R07.9 CHEST PAIN: Primary | ICD-10-CM

## 2022-08-08 LAB
2HR DELTA HS TROPONIN: 0 NG/L
ALBUMIN SERPL BCP-MCNC: 4.3 G/DL (ref 3.5–5)
ALP SERPL-CCNC: 108 U/L (ref 34–104)
ALT SERPL W P-5'-P-CCNC: 65 U/L (ref 7–52)
ANION GAP SERPL CALCULATED.3IONS-SCNC: 9 MMOL/L (ref 4–13)
AST SERPL W P-5'-P-CCNC: 32 U/L (ref 13–39)
ATRIAL RATE: 67 BPM
BASOPHILS # BLD AUTO: 0.05 THOUSANDS/ΜL (ref 0–0.1)
BASOPHILS NFR BLD AUTO: 1 % (ref 0–1)
BILIRUB SERPL-MCNC: 0.58 MG/DL (ref 0.2–1)
BUN SERPL-MCNC: 19 MG/DL (ref 5–25)
CALCIUM SERPL-MCNC: 9.3 MG/DL (ref 8.4–10.2)
CARDIAC TROPONIN I PNL SERPL HS: 2 NG/L
CARDIAC TROPONIN I PNL SERPL HS: 2 NG/L
CHLORIDE SERPL-SCNC: 103 MMOL/L (ref 96–108)
CO2 SERPL-SCNC: 25 MMOL/L (ref 21–32)
CREAT SERPL-MCNC: 0.81 MG/DL (ref 0.6–1.3)
EOSINOPHIL # BLD AUTO: 0.24 THOUSAND/ΜL (ref 0–0.61)
EOSINOPHIL NFR BLD AUTO: 3 % (ref 0–6)
ERYTHROCYTE [DISTWIDTH] IN BLOOD BY AUTOMATED COUNT: 13.2 % (ref 11.6–15.1)
FLUAV RNA RESP QL NAA+PROBE: NEGATIVE
FLUBV RNA RESP QL NAA+PROBE: NEGATIVE
GFR SERPL CREATININE-BSD FRML MDRD: 76 ML/MIN/1.73SQ M
GLUCOSE SERPL-MCNC: 92 MG/DL (ref 65–140)
HCT VFR BLD AUTO: 37.2 % (ref 34.8–46.1)
HGB BLD-MCNC: 12.4 G/DL (ref 11.5–15.4)
IMM GRANULOCYTES # BLD AUTO: 0.01 THOUSAND/UL (ref 0–0.2)
IMM GRANULOCYTES NFR BLD AUTO: 0 % (ref 0–2)
LYMPHOCYTES # BLD AUTO: 3 THOUSANDS/ΜL (ref 0.6–4.47)
LYMPHOCYTES NFR BLD AUTO: 41 % (ref 14–44)
MCH RBC QN AUTO: 30.7 PG (ref 26.8–34.3)
MCHC RBC AUTO-ENTMCNC: 33.3 G/DL (ref 31.4–37.4)
MCV RBC AUTO: 92 FL (ref 82–98)
MONOCYTES # BLD AUTO: 0.64 THOUSAND/ΜL (ref 0.17–1.22)
MONOCYTES NFR BLD AUTO: 9 % (ref 4–12)
NEUTROPHILS # BLD AUTO: 3.41 THOUSANDS/ΜL (ref 1.85–7.62)
NEUTS SEG NFR BLD AUTO: 46 % (ref 43–75)
NRBC BLD AUTO-RTO: 0 /100 WBCS
P AXIS: 67 DEGREES
PLATELET # BLD AUTO: 295 THOUSANDS/UL (ref 149–390)
PMV BLD AUTO: 9.4 FL (ref 8.9–12.7)
POTASSIUM SERPL-SCNC: 4 MMOL/L (ref 3.5–5.3)
PR INTERVAL: 172 MS
PROT SERPL-MCNC: 6.6 G/DL (ref 6.4–8.4)
QRS AXIS: 3 DEGREES
QRSD INTERVAL: 84 MS
QT INTERVAL: 414 MS
QTC INTERVAL: 431 MS
RBC # BLD AUTO: 4.04 MILLION/UL (ref 3.81–5.12)
RSV RNA RESP QL NAA+PROBE: NEGATIVE
SARS-COV-2 RNA RESP QL NAA+PROBE: NEGATIVE
SODIUM SERPL-SCNC: 137 MMOL/L (ref 135–147)
T WAVE AXIS: 56 DEGREES
VENTRICULAR RATE: 65 BPM
WBC # BLD AUTO: 7.35 THOUSAND/UL (ref 4.31–10.16)

## 2022-08-08 PROCEDURE — 0241U HB NFCT DS VIR RESP RNA 4 TRGT: CPT | Performed by: EMERGENCY MEDICINE

## 2022-08-08 PROCEDURE — 93010 ELECTROCARDIOGRAM REPORT: CPT | Performed by: INTERNAL MEDICINE

## 2022-08-08 PROCEDURE — 99284 EMERGENCY DEPT VISIT MOD MDM: CPT | Performed by: EMERGENCY MEDICINE

## 2022-08-08 PROCEDURE — 85025 COMPLETE CBC W/AUTO DIFF WBC: CPT | Performed by: EMERGENCY MEDICINE

## 2022-08-08 PROCEDURE — 80053 COMPREHEN METABOLIC PANEL: CPT | Performed by: EMERGENCY MEDICINE

## 2022-08-08 PROCEDURE — 36415 COLL VENOUS BLD VENIPUNCTURE: CPT | Performed by: EMERGENCY MEDICINE

## 2022-08-08 PROCEDURE — 93005 ELECTROCARDIOGRAM TRACING: CPT

## 2022-08-08 PROCEDURE — 71045 X-RAY EXAM CHEST 1 VIEW: CPT

## 2022-08-08 PROCEDURE — 99285 EMERGENCY DEPT VISIT HI MDM: CPT

## 2022-08-08 PROCEDURE — 84484 ASSAY OF TROPONIN QUANT: CPT | Performed by: EMERGENCY MEDICINE

## 2022-08-08 RX ORDER — SODIUM CHLORIDE 9 MG/ML
3 INJECTION INTRAVENOUS
Status: DISCONTINUED | OUTPATIENT
Start: 2022-08-08 | End: 2022-08-08 | Stop reason: HOSPADM

## 2022-08-08 RX ORDER — ACETAMINOPHEN 325 MG/1
650 TABLET ORAL ONCE
Status: COMPLETED | OUTPATIENT
Start: 2022-08-08 | End: 2022-08-08

## 2022-08-08 RX ORDER — ASPIRIN 81 MG/1
324 TABLET, CHEWABLE ORAL ONCE
Status: COMPLETED | OUTPATIENT
Start: 2022-08-08 | End: 2022-08-08

## 2022-08-08 RX ADMIN — ACETAMINOPHEN 650 MG: 325 TABLET ORAL at 18:25

## 2022-08-08 RX ADMIN — ASPIRIN 324 MG: 81 TABLET, CHEWABLE ORAL at 17:00

## 2022-08-08 NOTE — DISCHARGE INSTRUCTIONS
You were seen in the ED for chest pain  You had bloodwork, EKG, chest xrays, all showing no significant abnormalities  Please follow up with your primary care physician and/or cardiologist within the next 1-2 weeks for continued management of your condition  Please come back to the ED if your symptoms return or worsen or if you develop uncontrollable pain, shortness fo breath  Thank you very much for utilizing the ED this evening

## 2022-08-08 NOTE — ED PROVIDER NOTES
History  Chief Complaint   Patient presents with    Chest Pain     Pt reports started out feeling lightheaded and weak  She reports then getting chest pain, sob, fatigue, and headache  Chest pain started about 4 hours ago  Pt reports loose bowels  This is a 21-year-old female with a relevant past medical history of untreated hypertension, presenting to the ED today for complaint of chest pain  Her chest pain is left-sided, nonradiating, exertional in nature, associated with some shortness of breath  Patient states that her pain is mild-to-moderate in intensity, pressure in quality, without any other associated symptoms  She has had what she thinks is a sinus infection, with some nasal congestion, cough, and that has been going on for the past 2 days, but her chest pain just started today  Patient took an at-home COVID test which was negative  She is vaccinated for COVID, and her booster was in October  She has caught COVID this year  Chest Pain  Associated symptoms: cough and shortness of breath    Associated symptoms: no back pain, no dizziness, no fever, no nausea, not vomiting and no weakness        Prior to Admission Medications   Prescriptions Last Dose Informant Patient Reported? Taking?    ASHWAGANDHA PO  Self Yes No   Sig: Take by mouth daily   Ascorbic Acid (VITAMIN C) 500 MG CAPS  Self Yes No   Sig: Take by mouth daily    B Complex Vitamins (B COMPLEX 50 PO)  Self Yes No   Sig: Take by mouth daily    Boswellia Celina (BOSWELLIA PO)  Self Yes No   Sig: Take by mouth daily   Cholecalciferol (VITAMIN D3) 1000 units CAPS  Self Yes No   Sig: Take by mouth daily    Coenzyme Q10 (CO Q-10 PO)  Self Yes No   Sig: Take by mouth daily   FENUGREEK PO  Self Yes No   Sig: Take by mouth daily   MAGNESIUM PO  Self Yes No   Sig: Take by mouth   Multiple Vitamins-Minerals (ZINC PO)  Self Yes No   Sig: Take by mouth   NIACIN PO  Self Yes No   Sig: Take by mouth   Probiotic Product (PROBIOTIC DAILY PO)  Self Yes No   Sig: Take by mouth   Turmeric 500 MG TABS  Self Yes No   Sig: Take by mouth daily    diphenhydrAMINE (BENADRYL) 25 mg capsule  Self Yes No   Sig: Take 25 mg by mouth daily at bedtime as needed    levothyroxine 50 mcg tablet  Self Yes No   Sig: Take 50 mcg by mouth daily   valACYclovir (VALTREX) 1,000 mg tablet  Self Yes No   Sig: TAKE ONE TABLET BY MOUTH THREE TIMES A DAY X 7 DAYS   Patient not taking: Reported on 2022      Facility-Administered Medications: None       Past Medical History:   Diagnosis Date    Candidal vulvovaginitis     Cervical polyp     Chronic sinusitis     Gene mutation     Chek 2    Herpes simplex     Herpes simplex infection     Mild dysplasia of cervix (YOLANDE I)     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    Normal delivery     1985 daughter    Postmenopausal bleeding 2017    Rotator cuff tear, right     SVT (supraventricular tachycardia) ([address] )     Thickened endometrium 2017    UTI (urinary tract infection)     Vaginal discharge 2017       Past Surgical History:   Procedure Laterality Date    APPENDECTOMY      CERVICAL BIOPSY  W/ LOOP ELECTRODE EXCISION       SECTION       son    COLONOSCOPY      COMPLEX WOUND CLOSURE TO EXTREMITY N/A 2020    Procedure: COMPLEX CLOSURE CHIN;  Surgeon: Leia Soto MD;  Location:  MAIN OR;  Service: Plastics    CYSTOSCOPY      DILATION AND CURETTAGE OF UTERUS      ENDOMETRIAL ABLATION W/ NOVASURE      ENDOMETRIAL BIOPSY      WITHOUT CERVICAL DILATION    HYSTEROSCOPY      MOHS RECONSTRUCTION N/A 2020    Procedure: RECONSTRUCTION MOHS DEFECT OF CHIN;  Surgeon: Leia Soto MD;  Location:  MAIN OR;  Service: Plastics    OOPHORECTOMY Right     OVARIAN CYST REMOVAL      ROTATOR CUFF REPAIR  2007    SALPINGOOPHORECTOMY Right 1976    WITH APPENDECTOMY    TONSILLECTOMY         Family History   Problem Relation Age of Onset    Anemia Mother    [facility] Hypertension Mother     Hypertension Father     Prostate cancer Father     Anemia Sister     Thyroid cancer Sister     Coronary artery disease Maternal Grandmother     Lung cancer Maternal Grandfather      I have reviewed and agree with the history as documented  E-Cigarette/Vaping    E-Cigarette Use Former User      E-Cigarette/Vaping Substances    Nicotine No     THC No     CBD No     Flavoring No     Other No     Unknown No      Social History     Tobacco Use    Smoking status: Former Smoker     Packs/day: 0 50     Years: 10 00     Pack years: 5 00     Types: Cigarettes     Quit date:      Years since quittin 6    Smokeless tobacco: Never Used   Vaping Use    Vaping Use: Former   Substance Use Topics    Alcohol use: Yes     Comment: socially    Drug use: No       Review of Systems   Constitutional: Negative for activity change, chills and fever  HENT: Positive for congestion  Negative for rhinorrhea  Eyes: Negative for photophobia and visual disturbance  Respiratory: Positive for cough and shortness of breath  Negative for chest tightness  Cardiovascular: Positive for chest pain  Negative for leg swelling  Gastrointestinal: Negative for abdominal distention, nausea and vomiting  Genitourinary: Negative for dysuria and frequency  Musculoskeletal: Negative for back pain and neck stiffness  Skin: Negative for rash and wound  Neurological: Negative for dizziness and weakness  Psychiatric/Behavioral: Negative for agitation and suicidal ideas  Physical Exam  Physical Exam  Vitals and nursing note reviewed  Constitutional:       General: She is not in acute distress  Appearance: Normal appearance  She is normal weight  She is not ill-appearing  HENT:      Head: Normocephalic and atraumatic  Right Ear: External ear normal       Left Ear: External ear normal       Nose: Nose normal  No congestion or rhinorrhea        Mouth/Throat:      Mouth: Mucous membranes are moist       Pharynx: Oropharynx is clear  No oropharyngeal exudate  Eyes:      General: No scleral icterus  Conjunctiva/sclera: Conjunctivae normal    Cardiovascular:      Rate and Rhythm: Normal rate and regular rhythm  Pulses: Normal pulses  Heart sounds: Normal heart sounds  No murmur heard  No gallop  Pulmonary:      Effort: Pulmonary effort is normal  No respiratory distress  Breath sounds: Normal breath sounds  No stridor  No wheezing or rhonchi  Chest:      Chest wall: No tenderness or crepitus  Abdominal:      General: Abdomen is flat  Bowel sounds are normal  There is no distension  Palpations: Abdomen is soft  There is no mass  Tenderness: There is no abdominal tenderness  Musculoskeletal:         General: No swelling or tenderness  Normal range of motion  Cervical back: Normal range of motion and neck supple  No tenderness  Right lower leg: No edema  Left lower leg: No edema  Skin:     General: Skin is warm and dry  Capillary Refill: Capillary refill takes less than 2 seconds  Coloration: Skin is not jaundiced  Findings: No bruising  Neurological:      General: No focal deficit present  Mental Status: She is alert and oriented to person, place, and time  Mental status is at baseline  Sensory: No sensory deficit  Motor: No weakness  Psychiatric:         Mood and Affect: Mood normal          Behavior: Behavior normal          Thought Content:  Thought content normal          Judgment: Judgment normal          Vital Signs  ED Triage Vitals [08/08/22 1628]   Temperature Pulse Respirations Blood Pressure SpO2   98 °F (36 7 °C) 71 20 (!) 171/76 99 %      Temp Source Heart Rate Source Patient Position - Orthostatic VS BP Location FiO2 (%)   Oral Monitor Sitting Right arm --      Pain Score       --           Vitals:    08/08/22 1628   BP: (!) 171/76   Pulse: 71   Patient Position - Orthostatic VS: Sitting Visual Acuity      ED Medications  Medications - No data to display    Diagnostic Studies  Results Reviewed     None                 No orders to display              Procedures  Procedures         ED Course                                             MDM  Number of Diagnoses or Management Options  Chest pain  Diagnosis management comments: This is a 25-year-old female presenting to the ED today for complaint of chest pain  Her chest pain is left-sided, nonradiating, with associated shortness of breath  She denies any fever, but has had some nasal congestion, associated with a cough  She otherwise does not have any significant related symptoms  Her physical exam aside from her elevated blood pressure, is for the most part unremarkable  Her differential diagnosis includes:  Atypical ACS versus pneumonia versus COVID infection versus other  Her EKG does not show any significantly ischemic findings  She had a CBC, metabolic panel, chest x-ray, troponin x2 all of which were unremarkable  She was referred to Cardiology as an outpatient for stress testing  She was given strict return precautions with which she agreed to comply  She was discharged in stable condition and felt safe going home  Disposition  Final diagnoses:   None     ED Disposition     None      Follow-up Information    None         Patient's Medications   Discharge Prescriptions    No medications on file       No discharge procedures on file      PDMP Review     None          ED Provider  Electronically Signed by           Wallace Garcia MD  08/08/22 7912

## 2022-08-10 ENCOUNTER — APPOINTMENT (OUTPATIENT)
Dept: LAB | Facility: MEDICAL CENTER | Age: 67
End: 2022-08-10
Payer: COMMERCIAL

## 2022-08-10 DIAGNOSIS — I51.9 MYXEDEMA HEART DISEASE: ICD-10-CM

## 2022-08-10 DIAGNOSIS — E78.2 MIXED HYPERLIPIDEMIA: ICD-10-CM

## 2022-08-10 DIAGNOSIS — E03.9 MYXEDEMA HEART DISEASE: ICD-10-CM

## 2022-08-10 DIAGNOSIS — E55.9 VITAMIN D DEFICIENCY: ICD-10-CM

## 2022-08-10 DIAGNOSIS — K21.00 GASTROESOPHAGEAL REFLUX DISEASE WITH ESOPHAGITIS, UNSPECIFIED WHETHER HEMORRHAGE: ICD-10-CM

## 2022-08-10 LAB
25(OH)D3 SERPL-MCNC: 91.9 NG/ML (ref 30–100)
ALBUMIN SERPL BCP-MCNC: 4 G/DL (ref 3.5–5)
ALP SERPL-CCNC: 112 U/L (ref 46–116)
ALT SERPL W P-5'-P-CCNC: 73 U/L (ref 12–78)
ANION GAP SERPL CALCULATED.3IONS-SCNC: 3 MMOL/L (ref 4–13)
AST SERPL W P-5'-P-CCNC: 28 U/L (ref 5–45)
BASOPHILS # BLD AUTO: 0.05 THOUSANDS/ΜL (ref 0–0.1)
BASOPHILS NFR BLD AUTO: 1 % (ref 0–1)
BILIRUB SERPL-MCNC: 0.52 MG/DL (ref 0.2–1)
BUN SERPL-MCNC: 15 MG/DL (ref 5–25)
CALCIUM SERPL-MCNC: 9.2 MG/DL (ref 8.3–10.1)
CHLORIDE SERPL-SCNC: 108 MMOL/L (ref 96–108)
CHOLEST SERPL-MCNC: 253 MG/DL
CO2 SERPL-SCNC: 28 MMOL/L (ref 21–32)
CREAT SERPL-MCNC: 0.85 MG/DL (ref 0.6–1.3)
EOSINOPHIL # BLD AUTO: 0.27 THOUSAND/ΜL (ref 0–0.61)
EOSINOPHIL NFR BLD AUTO: 5 % (ref 0–6)
ERYTHROCYTE [DISTWIDTH] IN BLOOD BY AUTOMATED COUNT: 13.2 % (ref 11.6–15.1)
GFR SERPL CREATININE-BSD FRML MDRD: 72 ML/MIN/1.73SQ M
GLUCOSE P FAST SERPL-MCNC: 84 MG/DL (ref 65–99)
HCT VFR BLD AUTO: 39.9 % (ref 34.8–46.1)
HDLC SERPL-MCNC: 75 MG/DL
HGB BLD-MCNC: 13.1 G/DL (ref 11.5–15.4)
IMM GRANULOCYTES # BLD AUTO: 0.02 THOUSAND/UL (ref 0–0.2)
IMM GRANULOCYTES NFR BLD AUTO: 0 % (ref 0–2)
LDLC SERPL CALC-MCNC: 163 MG/DL (ref 0–100)
LYMPHOCYTES # BLD AUTO: 2.45 THOUSANDS/ΜL (ref 0.6–4.47)
LYMPHOCYTES NFR BLD AUTO: 41 % (ref 14–44)
MCH RBC QN AUTO: 31.2 PG (ref 26.8–34.3)
MCHC RBC AUTO-ENTMCNC: 32.8 G/DL (ref 31.4–37.4)
MCV RBC AUTO: 95 FL (ref 82–98)
MONOCYTES # BLD AUTO: 0.44 THOUSAND/ΜL (ref 0.17–1.22)
MONOCYTES NFR BLD AUTO: 7 % (ref 4–12)
NEUTROPHILS # BLD AUTO: 2.76 THOUSANDS/ΜL (ref 1.85–7.62)
NEUTS SEG NFR BLD AUTO: 46 % (ref 43–75)
NONHDLC SERPL-MCNC: 178 MG/DL
NRBC BLD AUTO-RTO: 0 /100 WBCS
PLATELET # BLD AUTO: 344 THOUSANDS/UL (ref 149–390)
PMV BLD AUTO: 9.9 FL (ref 8.9–12.7)
POTASSIUM SERPL-SCNC: 4.4 MMOL/L (ref 3.5–5.3)
PROT SERPL-MCNC: 7 G/DL (ref 6.4–8.4)
RBC # BLD AUTO: 4.2 MILLION/UL (ref 3.81–5.12)
SODIUM SERPL-SCNC: 139 MMOL/L (ref 135–147)
T4 FREE SERPL-MCNC: 0.94 NG/DL (ref 0.76–1.46)
TRIGL SERPL-MCNC: 74 MG/DL
TSH SERPL DL<=0.05 MIU/L-ACNC: 3.18 UIU/ML (ref 0.45–4.5)
WBC # BLD AUTO: 5.99 THOUSAND/UL (ref 4.31–10.16)

## 2022-08-10 PROCEDURE — 84443 ASSAY THYROID STIM HORMONE: CPT

## 2022-08-10 PROCEDURE — 82306 VITAMIN D 25 HYDROXY: CPT

## 2022-08-10 PROCEDURE — 84439 ASSAY OF FREE THYROXINE: CPT

## 2022-08-10 PROCEDURE — 80053 COMPREHEN METABOLIC PANEL: CPT

## 2022-08-10 PROCEDURE — 80061 LIPID PANEL: CPT

## 2022-08-10 PROCEDURE — 85025 COMPLETE CBC W/AUTO DIFF WBC: CPT

## 2022-08-10 PROCEDURE — 36415 COLL VENOUS BLD VENIPUNCTURE: CPT

## 2022-08-11 DIAGNOSIS — Z13.820 SCREENING FOR OSTEOPOROSIS: ICD-10-CM

## 2022-08-17 ENCOUNTER — TELEPHONE (OUTPATIENT)
Dept: OBGYN CLINIC | Facility: CLINIC | Age: 67
End: 2022-08-17

## 2022-08-17 NOTE — TELEPHONE ENCOUNTER
Pt called to ask if CT can write a script for a BSGI instead of an MRI for her breast  Pt is claustrophobic  Please advise

## 2022-08-18 NOTE — TELEPHONE ENCOUNTER
I'm not familiar with this test - so do not normally recommend or order  The is a fast breast MRI option that may be an option?   I believe it is just an out of pocket cost

## 2022-08-31 ENCOUNTER — CONSULT (OUTPATIENT)
Dept: CARDIOLOGY CLINIC | Facility: CLINIC | Age: 67
End: 2022-08-31
Payer: COMMERCIAL

## 2022-08-31 VITALS
SYSTOLIC BLOOD PRESSURE: 140 MMHG | WEIGHT: 198.5 LBS | HEIGHT: 63 IN | BODY MASS INDEX: 35.17 KG/M2 | OXYGEN SATURATION: 98 % | HEART RATE: 76 BPM | DIASTOLIC BLOOD PRESSURE: 70 MMHG

## 2022-08-31 DIAGNOSIS — E78.5 HYPERLIPIDEMIA, UNSPECIFIED HYPERLIPIDEMIA TYPE: ICD-10-CM

## 2022-08-31 DIAGNOSIS — R07.9 CHEST PAIN: Primary | ICD-10-CM

## 2022-08-31 DIAGNOSIS — I10 HYPERTENSION, UNSPECIFIED TYPE: ICD-10-CM

## 2022-08-31 PROCEDURE — 99204 OFFICE O/P NEW MOD 45 MIN: CPT | Performed by: STUDENT IN AN ORGANIZED HEALTH CARE EDUCATION/TRAINING PROGRAM

## 2022-08-31 NOTE — PROGRESS NOTES
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    Complex endometrial hyperplasia 04/04/2017    Pes anserinus tendinitis of left lower extremity 03/15/2017    Left knee pain 02/28/2017    Pelvic pain in female 01/16/2017    Increased frequency of urination 01/16/2017    Symptoms involving urinary system 10/10/2013    Hesitancy of micturition 08/20/2013    Abdominal pain, LLQ (left lower quadrant) 07/09/2013    Backache 07/09/2013     Assessment:  73 y o  female Hx per chart p/w sp ED visit 8/8/22 for CP, fatigue in setting of allergies acute on chronic sinusitis  HTN  status post adjacent tissue transfer/local flap reconstruction of a Mohs defect of the chin (Dr Nenita Kinney)  Remote 1/2 ppd smoker in 25s  HLD, contemplating statin now  obese    ED course: per chart  "Her differential diagnosis includes:  Atypical ACS versus pneumonia versus COVID infection versus other  Her EKG does not show any significantly ischemic findings  She had a CBC, metabolic panel, chest x-ray, troponin x2 all of which were unremarkable  She was referred to Cardiology as an outpatient for stress testing  "    ldl 163  trops wnl and flatx2  tsh wnl  cxr clear    TODAY's PLAN:  No red flag findings today    reasonable to pursue EST given Sx and risk factors    BP may need better control, she states her home log is usually SBP 130s but unclear  She will keep rigorous 1 week BP log and call her PCP with result in case needs med adjustment  Just started red yeast rice in place of already prescribed statin, on omega, and coq10 too  Discussed different options today and she states she is leaning towards just starting but still contemplating  Discussed therapeutic lifestyle changes, exercise, maintain good hydration, weight loss  Continue with current medications, no changes today    Studies - personally reviewed by me:    EKG:  Personally reviewed by me  8/8/22: NSR, nl axis, no acute ischemic STT changes    HPI:   73 y o  female Hx per chart p/w sp ED visit 8/8/22 for CP, fatigue in setting of allergies acute on chronic sinusitis  She felt diffuse chest pain/tightness during the ED episode where she felt her usual sinusitis x, became anxious and slightly SOB  Has not recurred and no CP before this event  Active lifestyle, often hikes aggressively up hills and rocks to achieve a HR of 150-160s - sometimes additional SOB but never provokes CP/dizziness/syncope/palpitations nor does she get these Sx in other scenarios  No new leg swelling, PND, pillow orthopnea, unintentional weight changes, fatigue  No new fevers, chills, cough, nausea, vomiting, diarrhea, dysuria  No toxic habits  The patient is not aware of any severe cardiac dz in 1st degree relatives or MI      Past Medical History:   Diagnosis Date    Candidal vulvovaginitis     Cervical polyp     Chronic sinusitis     Gene mutation     Chek 2    Herpes simplex     Herpes simplex infection     Mild dysplasia of cervix (YOLANDE I)     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 [address]     Thickened endometrium 01/20/2017    UTI (urinary tract infection)     Vaginal discharge 01/20/2017       ROS:  10 point ROS negative except as specified in HPI    Allergies   Allergen Reactions    Lamisil [Terbinafine] Hives    Penicillins Hives       Current Outpatient Medications   Medication Instructions    Ascorbic Acid (VITAMIN C) 500 MG CAPS Oral, Daily    ASHWAGANDHA PO Oral, Daily    B Complex Vitamins (B COMPLEX 50 PO) Oral, Daily    Boswellia Celina (BOSWELLIA PO) Oral, Daily    Cholecalciferol (VITAMIN D3) 1000 units CAPS Oral, Daily    Coenzyme Q10 (CO Q-10 PO) Oral, Daily    diphenhydrAMINE (BENADRYL) 25 mg, Oral, Daily at bedtime PRN    FENUGREEK PO Oral, Daily    levothyroxine 50 mcg, Oral, Daily    MAGNESIUM PO Oral    Multiple Vitamins-Minerals (ZINC PO) Oral    NIACIN PO Oral    Probiotic Product (PROBIOTIC DAILY PO) Oral    Turmeric 500 MG TABS Oral, Daily    valACYclovir (VALTREX) 1,000 mg tablet TAKE ONE TABLET BY MOUTH THREE TIMES A DAY X 7 DAYS        Social History     Socioeconomic History    Marital status: /Civil Union     Spouse name: Not on file    Number of children: Not on file    Years of education: Not on file    Highest education level: Not on file   Occupational History    Occupation: UNKNOWN     Employer: [employer]   Tobacco Use    Smoking status: Former Smoker     Packs/day: 0 50     Years: 10 00     Pack years: 5 00     Types: Cigarettes     Quit date:      Years since quittin 6    Smokeless tobacco: Never Used   Vaping Use    Vaping Use: Former   Substance and Sexual Activity    Alcohol use: Yes     Comment: socially    Drug use: No    Sexual activity: Yes     Partners: Male     Birth control/protection: Post-menopausal   Other Topics Concern    Not on file   Social History Narrative    Always uses seatbelt    Caffeine use    Daily coffee consumption (2 cups/day)    Exercising erratically    Feels safe at home      Social Determinants of Health     Financial Resource Strain: Not on file   Food Insecurity: Not on file   Transportation Needs: Not on file   Physical Activity: Not on file   Stress: Not on file   Social Connections: Not on file   Intimate Partner Violence: Not on file   Housing Stability: Not on file       Family History   Problem Relation Age of Onset    Anemia Mother     Hypertension Mother     Hypertension Father     Prostate cancer Father     Anemia Sister     Thyroid cancer Sister     Coronary artery disease Maternal Grandmother     Lung cancer Maternal Grandfather        Physical Exam:  Vitals:    08/31/22 1403   BP: 140/70   BP Location: Right arm   Patient Position: Sitting   Cuff Size: Large   Pulse: 76   SpO2: 98%   Weight: 90 kg (198 lb 8 oz)   Height: 5' 3 25" (1 607 m)     Constitutional: NAD, non toxic  Ears/nose/mouth/throat: atraumatic  CV: RRR, nl S1S2, no murmurs/rubs/gallups, no JVD, no HJR  Resp: ctabl  GI: Soft, NTND  MSK: no swollen joints in exposed areas  Extr: No edema, WWP  Pysche: Normal affect  Neuro: appropriate in conversation  Skin: dry and intact in exposed areas    Labs & Results:    Lab Results   Component Value Date    WBC 5 99 08/10/2022    HGB 13 1 08/10/2022    HCT 39 9 08/10/2022    MCV 95 08/10/2022     08/10/2022     Lab Results   Component Value Date    SODIUM 139 08/10/2022    K 4 4 08/10/2022     08/10/2022    CO2 28 08/10/2022    BUN 15 08/10/2022    CREATININE 0 85 08/10/2022    GLUC 92 08/08/2022    CALCIUM 9 2 08/10/2022     No results found for: BNP   Lab Results   Component Value Date    CHOLESTEROL 253 (H) 08/10/2022    CHOLESTEROL 228 (H) 06/15/2020    CHOLESTEROL 249 (H) 02/08/2020     Lab Results   Component Value Date    HDL 75 08/10/2022    HDL 75 06/15/2020    HDL 71 02/08/2020     Lab Results   Component Value Date    TRIG 74 08/10/2022    TRIG 49 06/15/2020    TRIG 79 02/08/2020     Lab Results   Component Value Date    [place] 178 08/10/2022    [place] 153 06/15/2020    [place] 178 02/08/2020       Counseling / Coordination of Care  Time spent today 46 minutes  Greater than 50% of total time was spent with the patient and / or family counseling and / or coordination of care  We discussed diagnoses, most recent studies and any changes in treatment      Thank you for the opportunity to participate in the care of this patient      Enrique Lester MD  Attending Physician  [facility]  [address] [place] [address]

## 2022-09-02 ENCOUNTER — TELEPHONE (OUTPATIENT)
Dept: NON INVASIVE DIAGNOSTICS | Facility: CLINIC | Age: 67
End: 2022-09-02

## 2022-09-02 NOTE — TELEPHONE ENCOUNTER
Called patient to confirm stress test  States she is going to call central scheduling to reschedule today

## 2022-09-08 ENCOUNTER — HOSPITAL ENCOUNTER (OUTPATIENT)
Dept: NON INVASIVE DIAGNOSTICS | Facility: HOSPITAL | Age: 67
Discharge: HOME | End: 2022-09-08
Attending: STUDENT IN AN ORGANIZED HEALTH CARE EDUCATION/TRAINING PROGRAM
Payer: COMMERCIAL

## 2022-09-08 VITALS — HEIGHT: 63 IN | WEIGHT: 198 LBS | BODY MASS INDEX: 35.08 KG/M2

## 2022-09-08 DIAGNOSIS — R07.9 CHEST PAIN: ICD-10-CM

## 2022-09-08 LAB
BASELINE ST DEPRESSION: 0 MM
CHEST PAIN STATEMENT: NORMAL
MAX DIASTOLIC BP: 60 MMHG
MAX HEART RATE: 144 BPM
MAX HR PERCENT: 92 %
MAX HR: 144 BPM
MAX PREDICTED HEART RATE: 155 BPM
MAX. SYSTOLIC BP: 240 MMHG
PROTOCOL NAME: NORMAL
RATE PRESSURE PRODUCT: NORMAL
REASON FOR TERMINATION: NORMAL
SL CV STRESS RECOVERY BP: NORMAL MMHG
SL CV STRESS RECOVERY HR: 77 BPM
SL CV STRESS RECOVERY O2 SAT: 98 %
SL CV STRESS STAGE REACHED: 2
STRESS ANGINA INDEX: 0
STRESS BASELINE BP: NORMAL MMHG
STRESS BASELINE HR: 72 BPM
STRESS DUKE TREADMILL SCORE: 0
STRESS O2 SAT REST: 98 %
STRESS PEAK HR: 144 BPM
STRESS PERCENT HR: 92 %
STRESS POST ESTIMATED WORKLOAD: 6.8 METS
STRESS POST EXERCISE DUR MIN: 4 MIN
STRESS POST EXERCISE DUR SEC: 52 SEC
STRESS POST O2 SAT PEAK: 98 %
STRESS POST PEAK BP: 240 MMHG
STRESS ST DEPRESSION: 1 MM
TARGET HR FORMULA: NORMAL
TEST INDICATION: NORMAL
TIME IN EXERCISE PHASE: NORMAL

## 2022-09-08 PROCEDURE — 93016 CV STRESS TEST SUPVJ ONLY: CPT | Performed by: INTERNAL MEDICINE

## 2022-09-08 PROCEDURE — 93018 CV STRESS TEST I&R ONLY: CPT | Performed by: INTERNAL MEDICINE

## 2022-09-08 PROCEDURE — 93017 CV STRESS TEST TRACING ONLY: CPT

## 2022-09-09 ENCOUNTER — TELEPHONE (OUTPATIENT)
Dept: CARDIOLOGY CLINIC | Facility: CLINIC | Age: 67
End: 2022-09-09

## 2022-09-20 ENCOUNTER — APPOINTMENT (OUTPATIENT)
Dept: LAB | Facility: MEDICAL CENTER | Age: 67
End: 2022-09-20
Payer: COMMERCIAL

## 2022-09-20 DIAGNOSIS — Z79.899 ENCOUNTER FOR LONG-TERM (CURRENT) USE OF OTHER MEDICATIONS: ICD-10-CM

## 2022-09-20 LAB
ALBUMIN SERPL BCP-MCNC: 4.1 G/DL (ref 3.5–5)
ALP SERPL-CCNC: 89 U/L (ref 46–116)
ALT SERPL W P-5'-P-CCNC: 46 U/L (ref 12–78)
AST SERPL W P-5'-P-CCNC: 24 U/L (ref 5–45)
BILIRUB DIRECT SERPL-MCNC: 0.11 MG/DL (ref 0–0.2)
BILIRUB SERPL-MCNC: 0.45 MG/DL (ref 0.2–1)
PROT SERPL-MCNC: 7 G/DL (ref 6.4–8.4)

## 2022-09-20 PROCEDURE — 36415 COLL VENOUS BLD VENIPUNCTURE: CPT

## 2022-09-20 PROCEDURE — 80076 HEPATIC FUNCTION PANEL: CPT

## 2023-03-01 ENCOUNTER — TELEPHONE (OUTPATIENT)
Dept: OBGYN CLINIC | Facility: CLINIC | Age: 68
End: 2023-03-01

## 2023-03-01 DIAGNOSIS — R92.2 DENSE BREAST TISSUE ON MAMMOGRAM: Primary | ICD-10-CM

## 2023-03-06 NOTE — TELEPHONE ENCOUNTER
Pt is following up - she would like the order for the mri put in her chart  Will it come up on MY Chart or does she need to pick it up?

## 2023-03-24 ENCOUNTER — TELEPHONE (OUTPATIENT)
Dept: OBGYN CLINIC | Facility: CLINIC | Age: 68
End: 2023-03-24

## 2023-03-24 NOTE — TELEPHONE ENCOUNTER
Dale Alonzo is calling back about her prior Tulio Redo, she said she was left a message saying that she was approved for [facility], but she has an [facility] 3/30   Please call her back

## 2023-03-30 ENCOUNTER — HOSPITAL ENCOUNTER (OUTPATIENT)
Dept: RADIOLOGY | Facility: HOSPITAL | Age: 68
Discharge: HOME | End: 2023-03-30
Attending: OBSTETRICS & GYNECOLOGY

## 2023-03-30 VITALS — HEIGHT: 63 IN | BODY MASS INDEX: 34.2 KG/M2 | WEIGHT: 193 LBS

## 2023-03-30 DIAGNOSIS — R92.2 DENSE BREAST TISSUE ON MAMMOGRAM: ICD-10-CM

## 2023-03-30 RX ADMIN — GADOBUTROL 8 ML: 604.72 INJECTION INTRAVENOUS at 18:39

## 2023-05-04 ENCOUNTER — APPOINTMENT (OUTPATIENT)
Dept: LAB | Facility: MEDICAL CENTER | Age: 68
End: 2023-05-04

## 2023-07-24 ENCOUNTER — ANNUAL EXAM (OUTPATIENT)
Age: 68
End: 2023-07-24
Payer: COMMERCIAL

## 2023-07-24 VITALS
DIASTOLIC BLOOD PRESSURE: 76 MMHG | WEIGHT: 199.8 LBS | SYSTOLIC BLOOD PRESSURE: 120 MMHG | HEIGHT: 63 IN | BODY MASS INDEX: 35.4 KG/M2

## 2023-07-24 DIAGNOSIS — Z01.419 ENCNTR FOR GYN EXAM (GENERAL) (ROUTINE) W/O ABN FINDINGS: Primary | ICD-10-CM

## 2023-07-24 DIAGNOSIS — Z12.31 ENCOUNTER FOR SCREENING MAMMOGRAM FOR MALIGNANT NEOPLASM OF BREAST: ICD-10-CM

## 2023-07-24 DIAGNOSIS — Z12.39 BREAST CANCER SCREENING, HIGH RISK PATIENT: ICD-10-CM

## 2023-07-24 PROCEDURE — G0101 CA SCREEN;PELVIC/BREAST EXAM: HCPCS | Performed by: OBSTETRICS & GYNECOLOGY

## 2023-07-24 NOTE — PROGRESS NOTES
Sonal Lazcano   1955    CC:  Yearly exam    S:  78 y.o. female here for yearly exam. She is postmenopausal and has had no vaginal bleeding. She denies vaginal discharge, itching, odor or dryness. Chek-2 mutation. Sexual activity: She is sexually active without pain, bleeding. She does report urinary incontinence every once in awhile, slight increase in urinary frequency, gets up at night for the last few months to void. No bowel problems, breast concerns. Last Pap: 6/26/20 - NILM, Neg HPV  Last Mammo: 8/3/2022 - [facility] 1, also MRI 3/2023  Last Colonoscopy: 2018 - scheduled 8/2023  Last DEXA: 8/9/22 - normal    We reviewed ASCCP guidelines for Pap testing.      Family hx of breast cancer: Daughter  Family hx of ovarian cancer: no  Family hx of colon cancer: no      Current Outpatient Medications:   •  Ascorbic Acid (VITAMIN C) 500 MG CAPS, Take by mouth daily , Disp: , Rfl:   •  ASHWAGANDHA PO, Take by mouth daily, Disp: , Rfl:   •  B Complex Vitamins (B COMPLEX 50 PO), Take by mouth daily , Disp: , Rfl:   •  Boswellia Celina (BOSWELLIA PO), Take by mouth daily, Disp: , Rfl:   •  Cholecalciferol (VITAMIN D3) 1000 units CAPS, Take by mouth daily , Disp: , Rfl:   •  Coenzyme Q10 (CO Q-10 PO), Take by mouth daily, Disp: , Rfl:   •  levothyroxine 50 mcg tablet, Take 50 mcg by mouth daily, Disp: , Rfl:   •  MAGNESIUM PO, Take by mouth, Disp: , Rfl:   •  Multiple Vitamins-Minerals (ZINC PO), Take by mouth, Disp: , Rfl:   •  NIACIN PO, Take by mouth, Disp: , Rfl:   •  Probiotic Product (PROBIOTIC DAILY PO), Take by mouth, Disp: , Rfl:   •  Turmeric 500 MG TABS, Take by mouth daily , Disp: , Rfl:   Patient Active Problem List   Diagnosis   • Abdominal pain, LLQ (left lower quadrant)   • Backache   • Chronic sinusitis   • Complex endometrial hyperplasia   • Intervertebral disc disorder with radiculopathy of lumbar region   • Left knee pain   • Nerve sheath tumor   • Pelvic pain in female   • Pes anserinus tendinitis of left lower extremity   • Symptoms involving urinary system   • Increased frequency of urination   • Hesitancy of micturition   • Encounter for gynecological examination (general) (routine) without abnormal findings   • Basal cell carcinoma of chin     Family History   Problem Relation Age of Onset   • Anemia Mother    • Hypertension Mother    • Hypertension Father    • Prostate cancer Father    • Kidney failure Father    • Cancer Father         bladder cancer   • Anemia Sister    • Thyroid cancer Sister    • No Known Problems Sister    • Breast cancer Daughter 39   • Coronary artery disease Maternal Grandmother    • Lung cancer Maternal Grandfather    • No Known Problems Paternal Grandmother    • Prostate cancer Paternal Grandfather    • No Known Problems Son    • No Known Problems Maternal Aunt    • No Known Problems Paternal Aunt    • No Known Problems Paternal Aunt      Past Medical History:   Diagnosis Date   • Candidal vulvovaginitis    • Cervical polyp    • Chronic sinusitis    • Gene mutation     Chek 2   • Herpes simplex    • Herpes simplex infection    • Mild dysplasia of cervix (YOLANDE I)    • 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 Cardiovascular: Negative. Gastrointestinal: Negative for constipation and diarrhea. Genitourinary: Negative for difficulty urinating, pelvic pain, vaginal bleeding, vaginal discharge, itching or odor. O:  Blood pressure 120/76, height 5' 3.39" (1.61 m), weight 90.6 kg (199 lb 12.8 oz). Patient appears well and is not in distress  Breasts are symmetrical without mass, tenderness, nipple discharge, skin changes or adenopathy. Abdomen is soft and nontender without masses. External genitals are normal without lesions or rashes. Urethral meatus and urethra are normal  Bladder is normal to palpation  Vagina is normal without discharge or bleeding, generalized atrophic changes present   Cervix is normal without discharge or lesion. Uterus is normal, mobile, nontender without palpable mass. Adnexa are normal, nontender, without palpable mass. A:  Yearly exam.     P:   Pap & HPV up to date  Mammo and MRI ordered for screening    Colon Cancer Screening due, encouraged   DEXA due 2024      Reviewed considerations of menopause, to call with any postmenopausal bleeding or other concerns. RTO one year for yearly exam or sooner as needed.

## 2023-08-07 DIAGNOSIS — Z12.31 ENCOUNTER FOR SCREENING MAMMOGRAM FOR MALIGNANT NEOPLASM OF BREAST: ICD-10-CM

## 2023-08-15 ENCOUNTER — HOSPITAL ENCOUNTER (OUTPATIENT)
Dept: CT IMAGING | Facility: HOSPITAL | Age: 68
Discharge: HOME | End: 2023-08-15
Payer: COMMERCIAL

## 2023-08-15 DIAGNOSIS — Z13.6 SCREENING FOR ISCHEMIC HEART DISEASE: ICD-10-CM

## 2023-08-15 PROCEDURE — 75571 CT HRT W/O DYE W/CA TEST: CPT

## 2023-08-15 PROCEDURE — G1004 CDSM NDSC: HCPCS

## 2023-08-21 ENCOUNTER — HOSPITAL ENCOUNTER (OUTPATIENT)
Dept: NON INVASIVE DIAGNOSTICS | Facility: CLINIC | Age: 68
Discharge: HOME | End: 2023-08-21
Payer: COMMERCIAL

## 2023-08-21 DIAGNOSIS — I65.23 BILATERAL CAROTID ARTERY OCCLUSION: ICD-10-CM

## 2023-08-21 PROCEDURE — 93880 EXTRACRANIAL BILAT STUDY: CPT

## 2023-08-21 PROCEDURE — 93880 EXTRACRANIAL BILAT STUDY: CPT | Performed by: SURGERY

## 2023-08-23 ENCOUNTER — LAB REQUISITION (OUTPATIENT)
Dept: LAB | Facility: HOSPITAL | Age: 68
End: 2023-08-23
Payer: COMMERCIAL

## 2023-08-23 DIAGNOSIS — Z86.010 PERSONAL HISTORY OF COLONIC POLYPS: ICD-10-CM

## 2023-08-23 DIAGNOSIS — Z12.11 ENCOUNTER FOR SCREENING FOR MALIGNANT NEOPLASM OF COLON: ICD-10-CM

## 2023-08-23 PROCEDURE — 88305 TISSUE EXAM BY PATHOLOGIST: CPT | Performed by: PATHOLOGY

## 2023-08-28 PROCEDURE — 88305 TISSUE EXAM BY PATHOLOGIST: CPT | Performed by: PATHOLOGY

## 2023-09-19 ENCOUNTER — APPOINTMENT (OUTPATIENT)
Dept: RADIOLOGY | Facility: MEDICAL CENTER | Age: 68
End: 2023-09-19
Payer: COMMERCIAL

## 2023-09-19 ENCOUNTER — TRANSCRIBE ORDERS (OUTPATIENT)
Dept: URGENT CARE | Facility: MEDICAL CENTER | Age: 68
End: 2023-09-19

## 2023-09-19 DIAGNOSIS — M17.9 OSTEOARTHRITIS OF KNEE, UNSPECIFIED LATERALITY, UNSPECIFIED OSTEOARTHRITIS TYPE: Primary | ICD-10-CM

## 2023-09-19 DIAGNOSIS — M17.9 OSTEOARTHRITIS OF KNEE, UNSPECIFIED LATERALITY, UNSPECIFIED OSTEOARTHRITIS TYPE: ICD-10-CM

## 2023-09-19 PROCEDURE — 73562 X-RAY EXAM OF KNEE 3: CPT

## 2023-09-20 ENCOUNTER — APPOINTMENT (OUTPATIENT)
Dept: LAB | Facility: MEDICAL CENTER | Age: 68
End: 2023-09-20
Payer: COMMERCIAL

## 2023-09-20 DIAGNOSIS — E03.9 HYPOTHYROIDISM, UNSPECIFIED TYPE: ICD-10-CM

## 2023-09-20 DIAGNOSIS — A69.20 LYME DISEASE: ICD-10-CM

## 2023-09-20 LAB
B BURGDOR IGG SERPL QL IA: POSITIVE
B BURGDOR IGG+IGM SER QL IA: POSITIVE
B BURGDOR IGM SERPL QL IA: NEGATIVE

## 2023-09-20 PROCEDURE — 86618 LYME DISEASE ANTIBODY: CPT

## 2023-09-20 PROCEDURE — 86753 PROTOZOA ANTIBODY NOS: CPT

## 2023-09-20 PROCEDURE — 86617 LYME DISEASE ANTIBODY: CPT

## 2023-09-20 PROCEDURE — 36415 COLL VENOUS BLD VENIPUNCTURE: CPT

## 2023-09-20 PROCEDURE — 86666 EHRLICHIA ANTIBODY: CPT

## 2023-09-22 ENCOUNTER — APPOINTMENT (OUTPATIENT)
Dept: LAB | Facility: MEDICAL CENTER | Age: 68
End: 2023-09-22
Payer: COMMERCIAL

## 2023-09-22 DIAGNOSIS — A69.20 LYME DISEASE: Primary | ICD-10-CM

## 2023-09-22 PROCEDURE — 86617 LYME DISEASE ANTIBODY: CPT

## 2023-09-22 PROCEDURE — 36415 COLL VENOUS BLD VENIPUNCTURE: CPT

## 2023-09-24 LAB
B BURGDOR IGG PATRN SER IB-IMP: NEGATIVE
B BURGDOR IGM PATRN SER IB-IMP: NEGATIVE
B BURGDOR18KD IGG SER QL IB: PRESENT
B BURGDOR23KD IGG SER QL IB: ABNORMAL
B BURGDOR23KD IGM SER QL IB: ABNORMAL
B BURGDOR28KD IGG SER QL IB: ABNORMAL
B BURGDOR30KD IGG SER QL IB: ABNORMAL
B BURGDOR39KD IGG SER QL IB: PRESENT
B BURGDOR39KD IGM SER QL IB: ABNORMAL
B BURGDOR41KD IGG SER QL IB: ABNORMAL
B BURGDOR41KD IGM SER QL IB: ABNORMAL
B BURGDOR45KD IGG SER QL IB: ABNORMAL
B BURGDOR58KD IGG SER QL IB: ABNORMAL
B BURGDOR66KD IGG SER QL IB: ABNORMAL
B BURGDOR93KD IGG SER QL IB: ABNORMAL

## 2023-09-27 LAB — MISCELLANEOUS LAB TEST RESULT: NORMAL

## 2023-10-05 ENCOUNTER — OFFICE VISIT (OUTPATIENT)
Dept: OBGYN CLINIC | Facility: CLINIC | Age: 68
End: 2023-10-05
Payer: COMMERCIAL

## 2023-10-05 VITALS
WEIGHT: 199 LBS | SYSTOLIC BLOOD PRESSURE: 136 MMHG | HEART RATE: 67 BPM | DIASTOLIC BLOOD PRESSURE: 72 MMHG | BODY MASS INDEX: 35.26 KG/M2 | HEIGHT: 63 IN

## 2023-10-05 DIAGNOSIS — M25.561 ACUTE PAIN OF RIGHT KNEE: Primary | ICD-10-CM

## 2023-10-05 DIAGNOSIS — M17.11 PRIMARY OSTEOARTHRITIS OF RIGHT KNEE: ICD-10-CM

## 2023-10-05 DIAGNOSIS — E66.01 OBESITY, MORBID (HCC): ICD-10-CM

## 2023-10-05 PROCEDURE — 20610 DRAIN/INJ JOINT/BURSA W/O US: CPT | Performed by: ORTHOPAEDIC SURGERY

## 2023-10-05 PROCEDURE — 99204 OFFICE O/P NEW MOD 45 MIN: CPT | Performed by: ORTHOPAEDIC SURGERY

## 2023-10-05 RX ORDER — METHYLPREDNISOLONE 4 MG/1
TABLET ORAL
Qty: 1 EACH | Refills: 1 | Status: SHIPPED | OUTPATIENT
Start: 2023-10-05

## 2023-10-05 RX ORDER — BETAMETHASONE SODIUM PHOSPHATE AND BETAMETHASONE ACETATE 3; 3 MG/ML; MG/ML
6 INJECTION, SUSPENSION INTRA-ARTICULAR; INTRALESIONAL; INTRAMUSCULAR; SOFT TISSUE
Status: COMPLETED | OUTPATIENT
Start: 2023-10-05 | End: 2023-10-05

## 2023-10-05 RX ORDER — LIDOCAINE HYDROCHLORIDE 10 MG/ML
1 INJECTION, SOLUTION INFILTRATION; PERINEURAL
Status: COMPLETED | OUTPATIENT
Start: 2023-10-05 | End: 2023-10-05

## 2023-10-05 RX ORDER — BUPIVACAINE HYDROCHLORIDE 2.5 MG/ML
1 INJECTION, SOLUTION INFILTRATION; PERINEURAL
Status: COMPLETED | OUTPATIENT
Start: 2023-10-05 | End: 2023-10-05

## 2023-10-05 RX ADMIN — BUPIVACAINE HYDROCHLORIDE 1 ML: 2.5 INJECTION, SOLUTION INFILTRATION; PERINEURAL at 13:15

## 2023-10-05 RX ADMIN — LIDOCAINE HYDROCHLORIDE 1 ML: 10 INJECTION, SOLUTION INFILTRATION; PERINEURAL at 13:15

## 2023-10-05 RX ADMIN — BETAMETHASONE SODIUM PHOSPHATE AND BETAMETHASONE ACETATE 6 MG: 3; 3 INJECTION, SUSPENSION INTRA-ARTICULAR; INTRALESIONAL; INTRAMUSCULAR; SOFT TISSUE at 13:15

## 2023-10-05 NOTE — PATIENT INSTRUCTIONS
Medrol dose francia is a 6 days course:  1st day take 2 tablets with Breakfast, 2 tablets with Lunch and 2 tablets with an afternoon snack. Do not take after 3pm, due to medication can keep you up from sleep. Each day decrease by 1 tablet from the last dose. Complete francia.

## 2023-10-05 NOTE — PROGRESS NOTES
Assessment:  1. Acute pain of right knee        2. Primary osteoarthritis of right knee  Medial  Knee Brace    Large joint arthrocentesis: R knee        Patient Active Problem List   Diagnosis   • Abdominal pain, LLQ (left lower quadrant)   • Backache   • Chronic sinusitis   • Complex endometrial hyperplasia   • Intervertebral disc disorder with radiculopathy of lumbar region   • Left knee pain   • Nerve sheath tumor   • Pelvic pain in female   • Pes anserinus tendinitis of left lower extremity   • Symptoms involving urinary system   • Increased frequency of urination   • Hesitancy of micturition   • Encounter for gynecological examination (general) (routine) without abnormal findings   • Basal cell carcinoma of chin   • Primary osteoarthritis of right knee           Plan      · Xrays of the right knee were reviewed at today's visit. · On exam, most of her pain is on the medial aspect of the knee in the same area noted on xrays  · Conservative treatments were discussed with the patient that includes localized  Cortisone injection, visco injections, medial  brace for her activities or long duration on the knee. · Patient wishes to proceed with the injection, which she tolerated well. She was fitted for a brace today. · Post injection ice, can continue with meloxicam and Tylenol  · We will follow up as needed            Subjective:     Patient ID:    Chief Complaint:Wilda [facility] 78 y.o. female      HPI    Patient comes in today with regards to Right knee pain. The patient reports that the pain is in the medial anterior aspect of the knee and has been going on for since she was digging and edging/landscaping her yard about 3 weeks ago. The pain is rated at1 at its best and8 at its worst. She feels that it's gradually getting better. The pain is described as dull aching. It is worsened with leg straight while sleeping, climbing stairs can't reciprocate, and is made better with sitting.   The patient has taken Meloxicam and Tylenol for treatment.       The following portions of the patient's history were reviewed and updated as appropriate: allergies, current medications, past family history, past social history, past surgical history and problem list.        Social History     Socioeconomic History   • Marital status: /Civil Union     Spouse name: Not on file   • Number of children: Not on file   • Years of education: Not on file   • Highest education level: Not on file   Occupational History   • Occupation: UNKNOWN     Employer: [employer]   Tobacco Use   • Smoking status: Former     Packs/day: 0.50     Years: 10.00     Total pack years: 5.00     Types: Cigarettes     Quit date: 47     Years since quittin.7   • Smokeless tobacco: Never   Vaping Use   • Vaping Use: Former   Substance and Sexual Activity   • Alcohol use: Yes     Comment: socially   • Drug use: No   • Sexual activity: Yes     Partners: Male     Birth control/protection: Post-menopausal   Other Topics Concern   • Not on file   Social History Narrative    Always uses seatbelt    Caffeine use    Daily coffee consumption (2 cups/day)    Exercising erratically    Feels safe at home      Social Determinants of Health     Financial Resource Strain: Not on file   Food Insecurity: Not on file   Transportation Needs: Not on file   Physical Activity: Not on file   Stress: Not on file   Social Connections: Not on file   Intimate Partner Violence: Not on file   Housing Stability: Not on file     Past Medical History:   Diagnosis Date   • Candidal vulvovaginitis    • Cervical polyp    • Chronic sinusitis    • Gene mutation     Chek 2   • Herpes simplex    • Herpes simplex infection    • Mild dysplasia of cervix (YOLANDE I)    • 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   • Normal delivery     1985 daughter   • Postmenopausal bleeding 2017   • Rotator cuff tear, right    • SVT (supraventricular tachycardia) • Thickened endometrium 2017   • UTI (urinary tract infection)    • Vaginal discharge 2017     Past Surgical History:   Procedure Laterality Date   • APPENDECTOMY     • CERVICAL BIOPSY  W/ LOOP ELECTRODE EXCISION     •  SECTION      1988 son   • COLONOSCOPY     • COMPLEX WOUND CLOSURE TO EXTREMITY N/A 2020    Procedure: COMPLEX CLOSURE CHIN;  Surgeon: Parviz Waller MD;  Location:  MAIN OR;  Service: Plastics   • CYSTOSCOPY     • DILATION AND CURETTAGE OF UTERUS     • ENDOMETRIAL ABLATION W/ NOVASURE     • ENDOMETRIAL BIOPSY      WITHOUT CERVICAL DILATION   • HYSTEROSCOPY     • MOHS RECONSTRUCTION N/A 2020    Procedure: RECONSTRUCTION MOHS DEFECT OF CHIN;  Surgeon: Parviz Waller MD;  Location: [facility]IN OR;  Service: Plastics   • OOPHORECTOMY Right    • OVARIAN CYST REMOVAL     • ROTATOR CUFF REPAIR     • SALPINGOOPHORECTOMY Right 1976    WITH APPENDECTOMY   • TONSILLECTOMY       Allergies   Allergen Reactions   • Lamisil [Terbinafine] Hives   • Penicillins Hives     Current Outpatient Medications on File Prior to Visit   Medication Sig Dispense Refill   • Ascorbic Acid (VITAMIN C) 500 MG CAPS Take by mouth daily      • ASHWAGANDHA PO Take by mouth daily     • B Complex Vitamins (B COMPLEX 50 PO) Take by mouth daily      • Boswellia Celina (BOSWELLIA PO) Take by mouth daily     • Cholecalciferol (VITAMIN D3) 1000 units CAPS Take by mouth daily      • Coenzyme Q10 (CO Q-10 PO) Take by mouth daily     • levothyroxine 50 mcg tablet Take 50 mcg by mouth daily     • MAGNESIUM PO Take by mouth     • Multiple Vitamins-Minerals (ZINC PO) Take by mouth     • NIACIN PO Take by mouth     • Probiotic Product (PROBIOTIC DAILY PO) Take by mouth     • Turmeric 500 MG TABS Take by mouth daily        No current facility-administered medications on file prior to visit.               Objective:    Review of Systems   Constitutional: Negative for chills, fever and unexpected weight change. HENT: Negative for hearing loss, nosebleeds and sore throat. Eyes: Negative for pain, redness and visual disturbance. Respiratory: Negative for cough, shortness of breath and wheezing. Cardiovascular: Negative for chest pain, palpitations and leg swelling. Gastrointestinal: Negative for abdominal pain and nausea. Genitourinary: Negative for dyspareunia, dysuria and frequency. Musculoskeletal: Positive for arthralgias. Skin: Negative for rash and wound. Neurological: Negative for dizziness, numbness and headaches. Psychiatric/Behavioral: Negative for decreased concentration and suicidal ideas. The patient is not nervous/anxious. Right Knee Exam     Tenderness   The patient is experiencing tenderness in the medial joint line, medial hamstring and pes anserinus (lateral patellar facet). Range of Motion   Extension: 0     Tests   Julio:  Medial - negative Lateral - negative  Varus: negative Valgus: negative (laxity but no instability)  Lachman:  Anterior - negative        Other   Erythema: absent  Sensation: normal  Pulse: present  Swelling: none  Effusion: no effusion present    Comments:  Calf is soft and non tender    Baker's cyst      Left Knee Exam     Range of Motion   Left knee flexion: crepitus              Physical Exam  Constitutional:       Appearance: She is well-developed. HENT:      Head: Normocephalic. Eyes:      Conjunctiva/sclera: Conjunctivae normal.   Cardiovascular:      Rate and Rhythm: Normal rate. Pulmonary:      Effort: Pulmonary effort is normal.   Musculoskeletal:      Cervical back: Normal range of motion. Right knee: No effusion. Instability Tests: Medial Julio test negative and lateral Julio test negative. Skin:     General: Skin is warm and dry. Neurological:      Mental Status: She is alert and oriented to person, place, and time.          Large joint arthrocentesis: R knee  Universal Protocol:  Consent: Verbal consent obtained. Risks and benefits: risks, benefits and alternatives were discussed  Consent given by: patient  Time out: Immediately prior to procedure a "time out" was called to verify the correct patient, procedure, equipment, support staff and site/side marked as required. Timeout called at: 10/5/2023 1:51 PM.  Patient understanding: patient states understanding of the procedure being performed  Site marked: the operative site was marked  Patient identity confirmed: verbally with patient    Supporting Documentation  Indications: pain   Procedure Details  Location: knee - R knee  Preparation: Patient was prepped and draped in the usual sterile fashion  Needle size: 22 G  Ultrasound guidance: no  Approach: anteromedial  Medications administered: 1 mL bupivacaine 0.25 %; 1 mL lidocaine 1 %; 6 mg betamethasone acetate-betamethasone sodium phosphate 6 (3-3) mg/mL    Patient tolerance: patient tolerated the procedure well with no immediate complications  Dressing:  Sterile dressing applied      I have personally reviewed pertinent films in PACS and my interpretation is xrays right knee 3 views: Arthritis affecting the medial joint space      Portions of the record may have been created with voice recognition software.  Occasional wrong word or "sound a like" substitutions may have occurred due to the inherent limitations of voice recognition software.  Read the chart carefully and recognize, using context, where substitutions have occurred.

## 2023-10-31 DIAGNOSIS — M17.11 PRIMARY OSTEOARTHRITIS OF RIGHT KNEE: ICD-10-CM

## 2023-10-31 RX ORDER — METHYLPREDNISOLONE 4 MG/1
TABLET ORAL
Qty: 21 EACH | Refills: 0 | Status: SHIPPED | OUTPATIENT
Start: 2023-10-31

## 2024-02-21 PROBLEM — Z01.419 ENCOUNTER FOR GYNECOLOGICAL EXAMINATION (GENERAL) (ROUTINE) WITHOUT ABNORMAL FINDINGS: Status: RESOLVED | Noted: 2019-02-27 | Resolved: 2024-02-21

## 2024-04-21 ENCOUNTER — HOSPITAL ENCOUNTER (OUTPATIENT)
Dept: RADIOLOGY | Facility: HOSPITAL | Age: 69
Discharge: HOME | End: 2024-04-21
Attending: OBSTETRICS & GYNECOLOGY
Payer: COMMERCIAL

## 2024-04-21 DIAGNOSIS — Z12.39 BREAST CANCER SCREENING, HIGH RISK PATIENT: ICD-10-CM

## 2024-04-21 PROCEDURE — G1004 CDSM NDSC: HCPCS

## 2024-04-21 PROCEDURE — A9585 GADOBUTROL INJECTION: HCPCS | Performed by: OBSTETRICS & GYNECOLOGY

## 2024-04-21 PROCEDURE — C8937 CAD BREAST MRI: HCPCS

## 2024-04-21 PROCEDURE — C8908 MRI W/O FOL W/CONT, BREAST,: HCPCS

## 2024-04-21 RX ORDER — GADOBUTROL 604.72 MG/ML
9 INJECTION INTRAVENOUS
Status: COMPLETED | OUTPATIENT
Start: 2024-04-21 | End: 2024-04-21

## 2024-04-21 RX ADMIN — GADOBUTROL 9 ML: 604.72 INJECTION INTRAVENOUS at 13:25

## 2024-06-10 ENCOUNTER — APPOINTMENT (OUTPATIENT)
Dept: LAB | Facility: MEDICAL CENTER | Age: 69
End: 2024-06-10
Payer: COMMERCIAL

## 2024-06-10 DIAGNOSIS — R89.9 ABNORMAL PLEURAL FLUID: ICD-10-CM

## 2024-06-10 DIAGNOSIS — E55.9 VITAMIN D DEFICIENCY DISEASE: ICD-10-CM

## 2024-06-10 DIAGNOSIS — E03.9 ACQUIRED HYPOTHYROIDISM: ICD-10-CM

## 2024-06-10 LAB
25(OH)D3 SERPL-MCNC: 116.2 NG/ML (ref 30–100)
B BURGDOR IGG SERPL QL IA: POSITIVE
B BURGDOR IGG+IGM SER QL IA: POSITIVE
B BURGDOR IGM SERPL QL IA: NEGATIVE
T4 FREE SERPL-MCNC: 0.7 NG/DL (ref 0.61–1.12)
TSH SERPL DL<=0.05 MIU/L-ACNC: 4.26 UIU/ML (ref 0.45–4.5)

## 2024-06-10 PROCEDURE — 36415 COLL VENOUS BLD VENIPUNCTURE: CPT

## 2024-06-10 PROCEDURE — 86618 LYME DISEASE ANTIBODY: CPT

## 2024-06-10 PROCEDURE — 84439 ASSAY OF FREE THYROXINE: CPT

## 2024-06-10 PROCEDURE — 86617 LYME DISEASE ANTIBODY: CPT

## 2024-06-10 PROCEDURE — 84443 ASSAY THYROID STIM HORMONE: CPT

## 2024-06-10 PROCEDURE — 82306 VITAMIN D 25 HYDROXY: CPT

## 2024-08-01 ENCOUNTER — ANNUAL EXAM (OUTPATIENT)
Age: 69
End: 2024-08-01
Payer: COMMERCIAL

## 2024-08-01 VITALS
WEIGHT: 167 LBS | BODY MASS INDEX: 29.59 KG/M2 | HEIGHT: 63 IN | DIASTOLIC BLOOD PRESSURE: 68 MMHG | SYSTOLIC BLOOD PRESSURE: 128 MMHG

## 2024-08-01 DIAGNOSIS — Z12.39 BREAST CANCER SCREENING, HIGH RISK PATIENT: ICD-10-CM

## 2024-08-01 DIAGNOSIS — Z12.31 SCREENING MAMMOGRAM FOR BREAST CANCER: ICD-10-CM

## 2024-08-01 DIAGNOSIS — Z01.419 ENCOUNTER FOR ROUTINE GYNECOLOGIC EXAMINATION IN MEDICARE PATIENT: Primary | ICD-10-CM

## 2024-08-01 DIAGNOSIS — Z12.4 SCREENING FOR CERVICAL CANCER: ICD-10-CM

## 2024-08-01 PROCEDURE — G0145 SCR C/V CYTO,THINLAYER,RESCR: HCPCS | Performed by: OBSTETRICS & GYNECOLOGY

## 2024-08-01 PROCEDURE — G0101 CA SCREEN;PELVIC/BREAST EXAM: HCPCS | Performed by: OBSTETRICS & GYNECOLOGY

## 2024-08-01 RX ORDER — MELOXICAM 15 MG/1
15 TABLET ORAL DAILY
COMMUNITY
Start: 2024-07-17

## 2024-08-01 NOTE — PROGRESS NOTES
Wilda Bunch   1955    CC:  Yearly exam    S:  68 y.o. female here for yearly exam. She is postmenopausal and has had no vaginal bleeding.  She denies vaginal discharge, itching, odor or dryness.     Chek-2 mutation.     Sexual activity: She is sexually active .  Does have some occasional discomfort with sex.   Does use coconut oil.     She does not report urinary incontinence, urinary concerns, bowel problems, breast concerns.     Last Pap: 6/26/20 - NILM, Neg HPV  Remote history of LEEP.   Last Mammo: 8/7/23 - BIRad 1  Breast MRI;  4/21/20024 - BIRad 1    Last Colonoscopy: 8/22/23 - 3-5yr recall   Last DEXA: 8/9/22 - normal     We reviewed ASC guidelines for Pap testing.     Family hx of breast cancer: daughter  Family hx of ovarian cancer: no  Family hx of colon cancer: no      Current Outpatient Medications:     Ascorbic Acid (VITAMIN C) 500 MG CAPS, Take by mouth daily , Disp: , Rfl:     ASHWAGANDHA PO, Take by mouth daily, Disp: , Rfl:     B Complex Vitamins (B COMPLEX 50 PO), Take by mouth daily , Disp: , Rfl:     Boswellia Celina (BOSWELLIA PO), Take by mouth daily, Disp: , Rfl:     Cholecalciferol (VITAMIN D3) 1000 units CAPS, Take by mouth daily , Disp: , Rfl:     Coenzyme Q10 (CO Q-10 PO), Take by mouth daily, Disp: , Rfl:     levothyroxine 50 mcg tablet, Take 50 mcg by mouth daily, Disp: , Rfl:     MAGNESIUM PO, Take by mouth, Disp: , Rfl:     meloxicam (MOBIC) 15 mg tablet, Take 15 mg by mouth daily, Disp: , Rfl:     Multiple Vitamins-Minerals (ZINC PO), Take by mouth, Disp: , Rfl:     NIACIN PO, Take by mouth, Disp: , Rfl:     Probiotic Product (PROBIOTIC DAILY PO), Take by mouth, Disp: , Rfl:     Turmeric 500 MG TABS, Take by mouth daily , Disp: , Rfl:     methylPREDNISolone 4 MG tablet therapy pack, USE AS DIRECTED ON PACKAGE, Disp: 21 each, Rfl: 0  Patient Active Problem List   Diagnosis    Abdominal pain, LLQ (left lower quadrant)    Backache    Chronic sinusitis    Complex endometrial  "hyperplasia    Intervertebral disc disorder with radiculopathy of lumbar region    Left knee pain    Nerve sheath tumor    Pelvic pain in female    Pes anserinus tendinitis of left lower extremity    Symptoms involving urinary system    Increased frequency of urination    Hesitancy of micturition    Basal cell carcinoma of chin    Primary osteoarthritis of right knee    Obesity, morbid (HCC)     Family History   Problem Relation Age of Onset    Anemia Mother     Hypertension Mother     Hypertension Father     Prostate cancer Father     Kidney failure Father     Cancer Father         bladder cancer    Diabetes Sister         pre- diabetic    Anemia Sister     Thyroid cancer Sister     Diabetes Sister     Breast cancer Daughter 36    No Known Problems Son     Coronary artery disease Maternal Grandmother     Lung cancer Maternal Grandfather     No Known Problems Paternal Grandmother     Prostate cancer Paternal Grandfather     No Known Problems Maternal Aunt     No Known Problems Paternal Aunt     No Known Problems Paternal Aunt      Past Medical History:   Diagnosis Date    Candidal vulvovaginitis     Cervical polyp     Chronic sinusitis     Gene mutation     Chek 2    Herpes simplex     Herpes simplex infection     Mild dysplasia of cervix (YOLANDE I)     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    Cardiovascular: Negative.    Gastrointestinal: Negative for constipation and diarrhea.   Genitourinary: Negative for difficulty urinating, pelvic pain, vaginal bleeding, vaginal discharge, itching or odor.    O:  Blood pressure 128/68, height 5' 3\" (1.6 m), weight 75.8 kg (167 lb).    Patient appears well and is not in " distress  Breasts are symmetrical without mass, tenderness, nipple discharge, skin changes or adenopathy.   Abdomen is soft and nontender without masses.   External genitals are normal without lesions or rashes.  Urethral meatus and urethra are normal  Bladder is normal to palpation  Vagina is normal without discharge or bleeding, generalized atrophic changes present   Cervix is normal without discharge or lesion.   Uterus is normal, mobile, nontender without palpable mass.  Adnexa are normal, nontender, without palpable mass.     A:  Yearly exam, High risk breast cancer screening    P:   Pap today  Mammo scheduled, MRI order placed.    Colon Cancer Screening up to date    DEXA deferred for now, normal.       Reviewed considerations of menopause, to call with any postmenopausal bleeding or other concerns.      RTO one year for yearly exam or sooner as needed.

## 2024-08-07 LAB
LAB AP GYN PRIMARY INTERPRETATION: NORMAL
Lab: NORMAL

## 2024-10-10 ENCOUNTER — HOSPITAL ENCOUNTER (OUTPATIENT)
Dept: RADIOLOGY | Facility: MEDICAL CENTER | Age: 69
Discharge: HOME | End: 2024-10-10
Payer: COMMERCIAL

## 2024-10-10 VITALS — HEIGHT: 63 IN | BODY MASS INDEX: 29.59 KG/M2 | WEIGHT: 167 LBS

## 2024-10-10 DIAGNOSIS — Z12.31 SCREENING MAMMOGRAM FOR BREAST CANCER: ICD-10-CM

## 2024-10-10 PROCEDURE — 77067 SCR MAMMO BI INCL CAD: CPT

## 2024-10-10 PROCEDURE — 77063 BREAST TOMOSYNTHESIS BI: CPT

## 2024-12-09 ENCOUNTER — APPOINTMENT (OUTPATIENT)
Dept: LAB | Facility: MEDICAL CENTER | Age: 69
End: 2024-12-09
Payer: COMMERCIAL

## 2024-12-09 DIAGNOSIS — E55.9 AVITAMINOSIS D: ICD-10-CM

## 2024-12-09 DIAGNOSIS — E78.2 MIXED HYPERLIPIDEMIA: ICD-10-CM

## 2024-12-09 DIAGNOSIS — E03.9 HYPOTHYROIDISM, ADULT: ICD-10-CM

## 2024-12-09 LAB
25(OH)D3 SERPL-MCNC: 98.4 NG/ML (ref 30–100)
CHOLEST SERPL-MCNC: 220 MG/DL (ref ?–200)
HDLC SERPL-MCNC: 84 MG/DL
LDLC SERPL CALC-MCNC: 121 MG/DL (ref 0–100)
NONHDLC SERPL-MCNC: 136 MG/DL
T4 FREE SERPL-MCNC: 1.07 NG/DL (ref 0.61–1.12)
TRIGL SERPL-MCNC: 74 MG/DL (ref ?–150)
TSH SERPL DL<=0.05 MIU/L-ACNC: 3.54 UIU/ML (ref 0.45–4.5)

## 2024-12-09 PROCEDURE — 84443 ASSAY THYROID STIM HORMONE: CPT

## 2024-12-09 PROCEDURE — 80061 LIPID PANEL: CPT

## 2024-12-09 PROCEDURE — 36415 COLL VENOUS BLD VENIPUNCTURE: CPT

## 2024-12-09 PROCEDURE — 82306 VITAMIN D 25 HYDROXY: CPT

## 2024-12-09 PROCEDURE — 84439 ASSAY OF FREE THYROXINE: CPT

## 2025-01-21 ENCOUNTER — HOSPITAL ENCOUNTER (OUTPATIENT)
Dept: RADIOLOGY | Facility: MEDICAL CENTER | Age: 70
Discharge: HOME | End: 2025-01-21
Payer: COMMERCIAL

## 2025-01-21 VITALS — BODY MASS INDEX: 27.49 KG/M2 | WEIGHT: 161 LBS | HEIGHT: 64 IN

## 2025-01-21 DIAGNOSIS — Z13.820 ENCOUNTER FOR SCREENING FOR OSTEOPOROSIS: ICD-10-CM

## 2025-01-21 DIAGNOSIS — I65.23 OCCLUSION AND STENOSIS OF BILATERAL CAROTID ARTERIES: ICD-10-CM

## 2025-01-21 PROCEDURE — 77080 DXA BONE DENSITY AXIAL: CPT

## 2025-01-21 PROCEDURE — 93880 EXTRACRANIAL BILAT STUDY: CPT | Performed by: SURGERY

## 2025-01-21 PROCEDURE — 93880 EXTRACRANIAL BILAT STUDY: CPT

## 2025-02-06 ENCOUNTER — OFFICE VISIT (OUTPATIENT)
Dept: VASCULAR SURGERY | Facility: CLINIC | Age: 70
End: 2025-02-06
Payer: COMMERCIAL

## 2025-02-06 VITALS
DIASTOLIC BLOOD PRESSURE: 64 MMHG | BODY MASS INDEX: 27.83 KG/M2 | WEIGHT: 163 LBS | RESPIRATION RATE: 18 BRPM | HEIGHT: 64 IN | SYSTOLIC BLOOD PRESSURE: 126 MMHG | HEART RATE: 62 BPM

## 2025-02-06 DIAGNOSIS — L81.9 DISCOLORATION OF SKIN OF FOOT: ICD-10-CM

## 2025-02-06 DIAGNOSIS — I78.1 SPIDER VEINS: Primary | ICD-10-CM

## 2025-02-06 PROCEDURE — 99203 OFFICE O/P NEW LOW 30 MIN: CPT | Performed by: NURSE PRACTITIONER

## 2025-02-06 NOTE — PROGRESS NOTES
"Name: Wilda Bunch      : 1955      MRN: [id number]  Encounter Provider: YESI Car  Encounter Date: 2025   Encounter department: [facility]  :  Assessment & Plan  Discoloration of skin of foot    Orders:    Ambulatory Referral to Vascular Surgery    Spider veins  69 year old female w/ mild carotid stenosis, lumbar radiculopathy, OA knee, BLE spider/reticular veins, L>R    Presents to the office for evaluation of discoloration at ankle, L>R    -\" Discoloration\" are small telangiectasias of the left anterior lateral distal lower leg and few telangiectasias on the right. Small short segment varicosity left medial mid calf and distal medial thigh.  No venous stasis skin changes. Palpable PT pulse bilaterally    -No significant symptoms of venous insufficiency.  Denies heaviness, throbbing, aching and swelling  -We discussed treatment for spider veins in the form of sclerotherapy.  I advised against sclerotherapy due to concern for a permanent hyperpigmentation with cosmetic sclerotherapy   -Small varicosities of the left lower extremity essentially asymptomatic.  -Recommended vascular intervention  -Follow-up in the office on an as-needed basis.  Any concerns or new symptoms notify the office        Chief Complaint   Patient presents with    Skin Discoloration     Lt ankle > Rt ankle.          History of Present Illness   Wilda Bunch is a 69 y.o. female who presents to the office for evaluation of discoloration of the lower extremities.  Small cluster telangiectasia left distal anterior lower leg just above the ankle is area of concern for discoloration.  No venous stasis changes.  Small dilated varicose vein short segment at left mid medial calf and left medial distal thigh.  Few spider/reticular veins right anterior lower leg.  She denies any symptoms of heaviness, throbbing, aching discomfort.  No lower extremity swelling.  No history of bleeding veins.  Reports " "occasional cramping.  History obtained from: patient    Review of Systems   Cardiovascular:  Negative for leg swelling.   Skin:  Negative for wound.     Medical History Reviewed by provider this encounter:  Tobacco  Allergies  Meds  Problems  Med Hx  Surg Hx  Fam Hx     .     Objective   I have reviewed and made appropriate changes to the review of systems input by the medical assistant.    Vitals:    25 1432   BP: 126/64   BP Location: Left arm   Patient Position: Sitting   Pulse: 62   Resp: 18   Weight: 73.9 kg (163 lb)   Height: 5' 4\" (1.626 m)       Patient Active Problem List   Diagnosis    Abdominal pain, LLQ (left lower quadrant)    Backache    Chronic sinusitis    Complex endometrial hyperplasia    Intervertebral disc disorder with radiculopathy of lumbar region    Left knee pain    Nerve sheath tumor    Pelvic pain in female    Pes anserinus tendinitis of left lower extremity    Symptoms involving urinary system    Increased frequency of urination    Hesitancy of micturition    Basal cell carcinoma of chin    Primary osteoarthritis of right knee    Obesity, morbid (HCC)    Spider veins       Past Surgical History:   Procedure Laterality Date    APPENDECTOMY      CERVICAL BIOPSY  W/ LOOP ELECTRODE EXCISION       SECTION       son    COLONOSCOPY      COMPLEX WOUND CLOSURE TO EXTREMITY N/A 2020    Procedure: COMPLEX CLOSURE CHIN;  Surgeon: Rolando Schwartz MD;  Location: [facility];  Service: Plastics    CYSTOSCOPY      DILATION AND CURETTAGE OF UTERUS      ENDOMETRIAL ABLATION W/ NOVASURE      ENDOMETRIAL BIOPSY      WITHOUT CERVICAL DILATION    HYSTEROSCOPY      MOHS RECONSTRUCTION N/A 2020    Procedure: RECONSTRUCTION MOHS DEFECT OF CHIN;  Surgeon: Rolando Schwartz MD;  Location: [facility];  Service: Plastics    OOPHORECTOMY Right     OVARIAN CYST REMOVAL      ROTATOR CUFF REPAIR  2007    SALPINGOOPHORECTOMY Right 1976    WITH APPENDECTOMY    TONSILLECTOMY   "       Family History   Problem Relation Age of Onset    Anemia Mother     Hypertension Mother     Hypertension Father     Prostate cancer Father     Kidney failure Father     Cancer Father         bladder cancer    Diabetes Sister         pre- diabetic    Anemia Sister     Thyroid cancer Sister     Diabetes Sister     Breast cancer Daughter 36    No Known Problems Son     Coronary artery disease Maternal Grandmother     Lung cancer Maternal Grandfather     No Known Problems Paternal Grandmother     Prostate cancer Paternal Grandfather     No Known Problems Maternal Aunt     No Known Problems Paternal Aunt     No Known Problems Paternal Aunt        Social History     Socioeconomic History    Marital status: /Civil Union     Spouse name: Not on file    Number of children: Not on file    Years of education: Not on file    Highest education level: Not on file   Occupational History    Occupation: UNKNOWN     Employer: [employer]   Tobacco Use    Smoking status: Former     Current packs/day: 0.00     Average packs/day: 0.5 packs/day for 10.0 years (5.0 ttl pk-yrs)     Types: Cigarettes     Start date:      Quit date:      Years since quittin.1    Smokeless tobacco: Never   Vaping Use    Vaping status: Former   Substance and Sexual Activity    Alcohol use: Yes     Comment: socially    Drug use: No    Sexual activity: Yes     Partners: Male     Birth control/protection: Post-menopausal   Other Topics Concern    Not on file   Social History Narrative    Always uses seatbelt    Caffeine use    Daily coffee consumption (2 cups/day)    Exercising erratically    Feels safe at home      Social Drivers of Health     Financial Resource Strain: Not on file   Food Insecurity: Not on file   Transportation Needs: Not on file   Physical Activity: Not on file   Stress: Not on file   Social Connections: Not on file   Intimate Partner Violence: Not on file   Housing Stability: Not on file  "      Allergies   Allergen Reactions    Lamisil [Terbinafine] Hives    Penicillins Hives         Current Outpatient Medications:     Ascorbic Acid (VITAMIN C) 500 MG CAPS, Take by mouth daily , Disp: , Rfl:     ASHWAGANDHA PO, Take by mouth daily, Disp: , Rfl:     B Complex Vitamins (B COMPLEX 50 PO), Take by mouth daily , Disp: , Rfl:     Boswellia Celina (BOSWELLIA PO), Take by mouth daily, Disp: , Rfl:     Cholecalciferol (VITAMIN D3) 1000 units CAPS, Take by mouth daily , Disp: , Rfl:     Coenzyme Q10 (CO Q-10 PO), Take by mouth daily, Disp: , Rfl:     levothyroxine 50 mcg tablet, Take 50 mcg by mouth daily, Disp: , Rfl:     MAGNESIUM PO, Take by mouth, Disp: , Rfl:     Multiple Vitamins-Minerals (ZINC PO), Take by mouth, Disp: , Rfl:     NIACIN PO, Take by mouth, Disp: , Rfl:     Probiotic Product (PROBIOTIC DAILY PO), Take by mouth, Disp: , Rfl:     Turmeric 500 MG TABS, Take by mouth daily , Disp: , Rfl:     meloxicam (MOBIC) 15 mg tablet, Take 15 mg by mouth daily, Disp: , Rfl:     methylPREDNISolone 4 MG tablet therapy pack, USE AS DIRECTED ON PACKAGE, Disp: 21 each, Rfl: 0   /64 (BP Location: Left arm, Patient Position: Sitting)   Pulse 62   Resp 18   Ht 5' 4\" (1.626 m)   Wt 73.9 kg (163 lb)   BMI 27.98 kg/m²      Physical Exam  Vitals and nursing note reviewed.   Constitutional:       Appearance: Normal appearance.   HENT:      Head: Normocephalic and atraumatic.   Eyes:      Extraocular Movements: Extraocular movements intact.   Cardiovascular:      Pulses:           Posterior tibial pulses are 2+ on the right side and 2+ on the left side.      Heart sounds: Normal heart sounds.   Pulmonary:      Effort: Pulmonary effort is normal.   Musculoskeletal:         General: No swelling. Normal range of motion.      Comments: Left distal anterior lateral lower leg just above the ankle 2 clusters of superficial telangiectasia.   Skin:     General: Skin is warm.   Neurological:      General: No focal " deficit present.      Mental Status: She is alert and oriented to person, place, and time.   Psychiatric:         Mood and Affect: Mood normal.         Behavior: Behavior normal.

## 2025-02-06 NOTE — ASSESSMENT & PLAN NOTE
"69 year old female w/ mild carotid stenosis, lumbar radiculopathy, OA knee, BLE spider/reticular veins, L>R    Presents to the office for evaluation of discoloration at ankle, L>R    -\" Discoloration\" are small telangiectasias of the left anterior lateral distal lower leg and few telangiectasias on the right. Small short segment varicosity left medial mid calf and distal medial thigh.  No venous stasis skin changes. Palpable PT pulse bilaterally    -No significant symptoms of venous insufficiency.  Denies heaviness, throbbing, aching and swelling  -We discussed treatment for spider veins in the form of sclerotherapy.  I advised against sclerotherapy due to concern for a permanent hyperpigmentation with cosmetic sclerotherapy   -Small varicosities of the left lower extremity essentially asymptomatic.  -Recommended vascular intervention  -Follow-up in the office on an as-needed basis.  Any concerns or new symptoms notify the office      "

## 2025-05-13 ENCOUNTER — HOSPITAL ENCOUNTER (OUTPATIENT)
Dept: MRI IMAGING | Facility: HOSPITAL | Age: 70
Discharge: HOME | End: 2025-05-13
Attending: OBSTETRICS & GYNECOLOGY
Payer: COMMERCIAL

## 2025-05-13 DIAGNOSIS — Z12.39 BREAST CANCER SCREENING, HIGH RISK PATIENT: ICD-10-CM

## 2025-05-13 PROCEDURE — C8908 MRI W/O FOL W/CONT, BREAST,: HCPCS

## 2025-05-13 PROCEDURE — A9585 GADOBUTROL INJECTION: HCPCS | Performed by: OBSTETRICS & GYNECOLOGY

## 2025-05-13 PROCEDURE — C8937 CAD BREAST MRI: HCPCS

## 2025-05-13 RX ORDER — GADOBUTROL 604.72 MG/ML
7 INJECTION INTRAVENOUS
Status: COMPLETED | OUTPATIENT
Start: 2025-05-13 | End: 2025-05-13

## 2025-05-13 RX ADMIN — GADOBUTROL 7 ML: 604.72 INJECTION INTRAVENOUS at 13:55

## 2025-06-25 ENCOUNTER — APPOINTMENT (OUTPATIENT)
Dept: LAB | Facility: MEDICAL CENTER | Age: 70
End: 2025-06-25
Attending: FAMILY MEDICINE
Payer: COMMERCIAL

## 2025-08-11 ENCOUNTER — APPOINTMENT (OUTPATIENT)
Dept: RADIOLOGY | Facility: MEDICAL CENTER | Age: 70
End: 2025-08-11
Payer: COMMERCIAL

## (undated) DEVICE — GLOVE SRG BIOGEL 7

## (undated) DEVICE — SUT MONOCRYL 6-0 P-3 18 IN Y492G

## (undated) DEVICE — 3M™ STERI-STRIP™ REINFORCED ADHESIVE SKIN CLOSURES, R1547, 1/2 IN X 4 IN (12 MM X 100 MM), 6 STRIPS/ENVELOPE: Brand: 3M™ STERI-STRIP™

## (undated) DEVICE — INTENDED FOR TISSUE SEPARATION, AND OTHER PROCEDURES THAT REQUIRE A SHARP SURGICAL BLADE TO PUNCTURE OR CUT.: Brand: BARD-PARKER ® CARBON RIB-BACK BLADES

## (undated) DEVICE — 3M™ STERI-STRIP™ COMPOUND BENZOIN TINCTURE 40 BAGS/CARTON 4 CARTONS/CASE C1544: Brand: 3M™ STERI-STRIP™

## (undated) DEVICE — NEEDLE 27 G X 1 1/4

## (undated) DEVICE — SUT MONOCRYL 5-0 P-3 18 IN Y493G

## (undated) DEVICE — INTENDED FOR TISSUE SEPARATION, AND OTHER PROCEDURES THAT REQUIRE A SHARP SURGICAL BLADE TO PUNCTURE OR CUT.: Brand: BARD-PARKER SAFETY BLADES SIZE 15, STERILE

## (undated) DEVICE — POV-IOD SOLUTION 4OZ BT

## (undated) DEVICE — SKIN MARKER DUAL TIP WITH RULER CAP, FLEXIBLE RULER AND LABELS: Brand: DEVON

## (undated) DEVICE — BETHLEHEM UNIVERSAL OUTPATIENT: Brand: CARDINAL HEALTH

## (undated) DEVICE — TIBURON SPLIT SHEET: Brand: CONVERTORS

## (undated) DEVICE — SCD SEQUENTIAL COMPRESSION COMFORT SLEEVE MEDIUM KNEE LENGTH: Brand: KENDALL SCD

## (undated) DEVICE — TUBING SUCTION 5MM X 12 FT

## (undated) DEVICE — ELECTRODE NEEDLE MEGAFINE 2IN E-Z CLEAN MEGADYNE -0118

## (undated) DEVICE — OCCLUSIVE GAUZE STRIP,3% BISMUTH TRIBROMOPHENATE IN PETROLATUM BLEND: Brand: XEROFORM

## (undated) DEVICE — PAD GROUNDING ADULT

## (undated) DEVICE — VESSEL CANNULA